# Patient Record
Sex: FEMALE | Race: WHITE | NOT HISPANIC OR LATINO | Employment: OTHER | URBAN - METROPOLITAN AREA
[De-identification: names, ages, dates, MRNs, and addresses within clinical notes are randomized per-mention and may not be internally consistent; named-entity substitution may affect disease eponyms.]

---

## 2017-07-27 ENCOUNTER — HISTORICAL (OUTPATIENT)
Dept: RADIOLOGY | Facility: HOSPITAL | Age: 82
End: 2017-07-27

## 2018-04-19 ENCOUNTER — HISTORICAL (OUTPATIENT)
Dept: LAB | Facility: HOSPITAL | Age: 83
End: 2018-04-19

## 2018-04-19 LAB
ABS NEUT (OLG): 3.2 X10(3)/MCL (ref 2.1–9.2)
ALBUMIN SERPL-MCNC: 3.9 GM/DL (ref 3.4–5)
ALBUMIN/GLOB SERPL: 1.3 RATIO (ref 1.1–2)
ALP SERPL-CCNC: 72 UNIT/L (ref 46–116)
ALT SERPL-CCNC: 21 UNIT/L (ref 12–78)
AST SERPL-CCNC: 21 UNIT/L (ref 15–37)
BASOPHILS NFR BLD AUTO: 1 % (ref 0–2)
BILIRUB SERPL-MCNC: 0.5 MG/DL (ref 0.2–1)
BILIRUBIN DIRECT+TOT PNL SERPL-MCNC: 0.16 MG/DL (ref 0–0.2)
BILIRUBIN DIRECT+TOT PNL SERPL-MCNC: 0.34 MG/DL (ref 0–0.8)
BUN SERPL-MCNC: 15 MG/DL (ref 7–18)
CALCIUM SERPL-MCNC: 9.7 MG/DL (ref 8.5–10.1)
CHLORIDE SERPL-SCNC: 98 MMOL/L (ref 98–107)
CO2 SERPL-SCNC: 26.9 MMOL/L (ref 21–32)
CREAT SERPL-MCNC: 1.09 MG/DL (ref 0.6–1.3)
EOSINOPHIL NFR BLD AUTO: 1 %
ERYTHROCYTE [DISTWIDTH] IN BLOOD BY AUTOMATED COUNT: 13.5 % (ref 11.5–17)
EST. AVERAGE GLUCOSE BLD GHB EST-MCNC: 103 MG/DL
GLOBULIN SER-MCNC: 3.1 GM/DL (ref 2.4–3.5)
GLUCOSE SERPL-MCNC: 145 MG/DL (ref 74–106)
HBA1C MFR BLD: 5.2 % (ref 4.5–6.2)
HCT VFR BLD AUTO: 31.5 % (ref 37–47)
HGB BLD-MCNC: 10.7 GM/DL (ref 12–16)
LYMPHOCYTES # BLD AUTO: 1.3 X10(3)/MCL
LYMPHOCYTES NFR BLD AUTO: 26 % (ref 13–40)
MCH RBC QN AUTO: 30.9 PG (ref 27–31)
MCHC RBC AUTO-ENTMCNC: 33.9 GM/DL (ref 33–36)
MCV RBC AUTO: 91.1 FL (ref 80–94)
MONOCYTES # BLD AUTO: 0.4 X10(3)/MCL
MONOCYTES NFR BLD AUTO: 7 % (ref 2–11)
NEUTROPHILS # BLD AUTO: 3.2 X10(3)/MCL (ref 2.1–9.2)
NEUTROPHILS NFR BLD AUTO: 65 % (ref 47–80)
PLATELET # BLD AUTO: 194 X10(3)/MCL (ref 130–400)
PMV BLD AUTO: 8.3 FL (ref 7.4–10.4)
POTASSIUM SERPL-SCNC: 4 MMOL/L (ref 3.5–5.1)
PROT SERPL-MCNC: 7 GM/DL (ref 6.4–8.2)
RBC # BLD AUTO: 3.45 X10(6)/MCL (ref 4.2–5.4)
SODIUM SERPL-SCNC: 135 MMOL/L (ref 136–145)
TSH SERPL-ACNC: 1.79 MIU/ML (ref 0.36–3.74)
WBC # SPEC AUTO: 4.9 X10(3)/MCL (ref 4.5–11.5)

## 2018-04-23 ENCOUNTER — HISTORICAL (OUTPATIENT)
Dept: RADIOLOGY | Facility: HOSPITAL | Age: 83
End: 2018-04-23

## 2018-06-13 ENCOUNTER — HISTORICAL (OUTPATIENT)
Dept: RADIOLOGY | Facility: HOSPITAL | Age: 83
End: 2018-06-13

## 2019-02-11 ENCOUNTER — HISTORICAL (OUTPATIENT)
Dept: LAB | Facility: HOSPITAL | Age: 84
End: 2019-02-11

## 2019-02-11 LAB
ABS NEUT (OLG): 2.07 X10(3)/MCL (ref 2.1–9.2)
ALBUMIN SERPL-MCNC: 3.7 GM/DL (ref 3.4–5)
ALBUMIN/GLOB SERPL: 1.2 RATIO (ref 1.1–2)
ALP SERPL-CCNC: 78 UNIT/L (ref 46–116)
ALT SERPL-CCNC: 16 UNIT/L (ref 12–78)
AST SERPL-CCNC: 17 UNIT/L (ref 15–37)
BASOPHILS # BLD AUTO: 0 X10(3)/MCL (ref 0–0.2)
BASOPHILS NFR BLD AUTO: 1 %
BILIRUB SERPL-MCNC: 0.6 MG/DL (ref 0.2–1)
BILIRUBIN DIRECT+TOT PNL SERPL-MCNC: 0.18 MG/DL (ref 0–0.2)
BILIRUBIN DIRECT+TOT PNL SERPL-MCNC: 0.42 MG/DL (ref 0–0.8)
BUN SERPL-MCNC: 32.1 MG/DL (ref 7–18)
CALCIUM SERPL-MCNC: 9.8 MG/DL (ref 8.5–10.1)
CHLORIDE SERPL-SCNC: 104 MMOL/L (ref 98–107)
CHOLEST SERPL-MCNC: 128 MG/DL (ref 0–200)
CHOLEST/HDLC SERPL: 1.5 {RATIO} (ref 0–4)
CO2 SERPL-SCNC: 27.5 MMOL/L (ref 21–32)
CREAT SERPL-MCNC: 1.1 MG/DL (ref 0.6–1.3)
EOSINOPHIL # BLD AUTO: 0.1 X10(3)/MCL (ref 0–0.9)
EOSINOPHIL NFR BLD AUTO: 3 %
ERYTHROCYTE [DISTWIDTH] IN BLOOD BY AUTOMATED COUNT: 13 % (ref 11.5–17)
GLOBULIN SER-MCNC: 3 GM/DL (ref 2.4–3.5)
GLUCOSE SERPL-MCNC: 94 MG/DL (ref 74–106)
HCT VFR BLD AUTO: 30.7 % (ref 37–47)
HDLC SERPL-MCNC: 84 MG/DL (ref 40–60)
HGB BLD-MCNC: 10.2 GM/DL (ref 12–16)
LDLC SERPL CALC-MCNC: 36 MG/DL (ref 0–129)
LYMPHOCYTES # BLD AUTO: 1 X10(3)/MCL (ref 0.6–4.6)
LYMPHOCYTES NFR BLD AUTO: 30 %
MCH RBC QN AUTO: 31.3 PG (ref 27–31)
MCHC RBC AUTO-ENTMCNC: 33.2 GM/DL (ref 33–36)
MCV RBC AUTO: 94.2 FL (ref 80–94)
MONOCYTES # BLD AUTO: 0.3 X10(3)/MCL (ref 0.1–1.3)
MONOCYTES NFR BLD AUTO: 8 %
NEUTROPHILS # BLD AUTO: 2.07 X10(3)/MCL (ref 1.4–7.9)
NEUTROPHILS NFR BLD AUTO: 59 %
PLATELET # BLD AUTO: 153 X10(3)/MCL (ref 130–400)
PMV BLD AUTO: 10.1 FL (ref 9.4–12.4)
POTASSIUM SERPL-SCNC: 4.2 MMOL/L (ref 3.5–5.1)
PROT SERPL-MCNC: 6.7 GM/DL (ref 6.4–8.2)
RBC # BLD AUTO: 3.26 X10(6)/MCL (ref 4.2–5.4)
SODIUM SERPL-SCNC: 140 MMOL/L (ref 136–145)
TRIGL SERPL-MCNC: 41 MG/DL
VLDLC SERPL CALC-MCNC: 8 MG/DL
WBC # SPEC AUTO: 3.5 X10(3)/MCL (ref 4.5–11.5)

## 2019-02-19 ENCOUNTER — HISTORICAL (OUTPATIENT)
Dept: LAB | Facility: HOSPITAL | Age: 84
End: 2019-02-19

## 2019-02-19 LAB
COLOR STL: ABNORMAL
CONSISTENCY STL: ABNORMAL
HEMOCCULT SP1 STL QL: POSITIVE

## 2019-04-08 ENCOUNTER — HISTORICAL (OUTPATIENT)
Dept: LAB | Facility: HOSPITAL | Age: 84
End: 2019-04-08

## 2019-04-08 LAB
ABS NEUT (OLG): 1.98 X10(3)/MCL (ref 2.1–9.2)
BASOPHILS # BLD AUTO: 0 X10(3)/MCL (ref 0–0.2)
BASOPHILS NFR BLD AUTO: 1 %
EOSINOPHIL # BLD AUTO: 0.1 X10(3)/MCL (ref 0–0.9)
EOSINOPHIL NFR BLD AUTO: 3 %
ERYTHROCYTE [DISTWIDTH] IN BLOOD BY AUTOMATED COUNT: 12.4 % (ref 11.5–17)
FERRITIN SERPL-MCNC: 71 NG/ML (ref 8–388)
FOLATE SERPL-MCNC: 78 NG/ML (ref 8.6–58.9)
HCT VFR BLD AUTO: 30 % (ref 37–47)
HGB BLD-MCNC: 10.3 GM/DL (ref 12–16)
IRON SATN MFR SERPL: 25 % (ref 20–50)
IRON SERPL-MCNC: 72 MCG/DL (ref 50–175)
LYMPHOCYTES # BLD AUTO: 1.2 X10(3)/MCL (ref 0.6–4.6)
LYMPHOCYTES NFR BLD AUTO: 33 %
MCH RBC QN AUTO: 31.9 PG (ref 27–31)
MCHC RBC AUTO-ENTMCNC: 34.3 GM/DL (ref 33–36)
MCV RBC AUTO: 92.9 FL (ref 80–94)
MONOCYTES # BLD AUTO: 0.4 X10(3)/MCL (ref 0.1–1.3)
MONOCYTES NFR BLD AUTO: 10 %
NEUTROPHILS # BLD AUTO: 1.98 X10(3)/MCL (ref 1.4–7.9)
NEUTROPHILS NFR BLD AUTO: 53 %
PLATELET # BLD AUTO: 151 X10(3)/MCL (ref 130–400)
PMV BLD AUTO: 10.3 FL (ref 9.4–12.4)
RBC # BLD AUTO: 3.23 X10(6)/MCL (ref 4.2–5.4)
TIBC SERPL-MCNC: 288 MCG/DL (ref 250–450)
TRANSFERRIN SERPL-MCNC: 229 MG/DL (ref 200–360)
VIT B12 SERPL-MCNC: 670 PG/ML (ref 193–986)
WBC # SPEC AUTO: 3.7 X10(3)/MCL (ref 4.5–11.5)

## 2019-08-14 ENCOUNTER — HISTORICAL (OUTPATIENT)
Dept: LAB | Facility: HOSPITAL | Age: 84
End: 2019-08-14

## 2019-08-14 LAB
ABS NEUT (OLG): 3.76 X10(3)/MCL (ref 2.1–9.2)
ALBUMIN SERPL-MCNC: 3.8 GM/DL (ref 3.4–5)
ALBUMIN/GLOB SERPL: 1.1 RATIO (ref 1.1–2)
ALP SERPL-CCNC: 80 UNIT/L (ref 46–116)
ALT SERPL-CCNC: 17 UNIT/L (ref 12–78)
AST SERPL-CCNC: 18 UNIT/L (ref 15–37)
BASOPHILS # BLD AUTO: 0 X10(3)/MCL (ref 0–0.2)
BASOPHILS NFR BLD AUTO: 0 %
BILIRUB SERPL-MCNC: 0.6 MG/DL (ref 0.2–1)
BILIRUBIN DIRECT+TOT PNL SERPL-MCNC: 0.18 MG/DL (ref 0–0.2)
BILIRUBIN DIRECT+TOT PNL SERPL-MCNC: 0.42 MG/DL (ref 0–0.8)
BUN SERPL-MCNC: 37.3 MG/DL (ref 7–18)
CALCIUM SERPL-MCNC: 10.2 MG/DL (ref 8.5–10.1)
CHLORIDE SERPL-SCNC: 106 MMOL/L (ref 98–107)
CHOLEST SERPL-MCNC: 137 MG/DL (ref 0–200)
CHOLEST/HDLC SERPL: 1.6 {RATIO} (ref 0–4)
CO2 SERPL-SCNC: 29.8 MMOL/L (ref 21–32)
CREAT SERPL-MCNC: 1.25 MG/DL (ref 0.6–1.3)
DEPRECATED CALCIDIOL+CALCIFEROL SERPL-MC: 52.01 NG/ML (ref 30–80)
EOSINOPHIL # BLD AUTO: 0.2 X10(3)/MCL (ref 0–0.9)
EOSINOPHIL NFR BLD AUTO: 3 %
ERYTHROCYTE [DISTWIDTH] IN BLOOD BY AUTOMATED COUNT: 12.5 % (ref 11.5–17)
GLOBULIN SER-MCNC: 3.4 GM/DL (ref 2.4–3.5)
GLUCOSE SERPL-MCNC: 102 MG/DL (ref 74–106)
HCT VFR BLD AUTO: 33.1 % (ref 37–47)
HDLC SERPL-MCNC: 84 MG/DL (ref 40–60)
HGB BLD-MCNC: 10.6 GM/DL (ref 12–16)
IMM GRANULOCYTES # BLD AUTO: 0.01 % (ref 0–0.02)
IMM GRANULOCYTES NFR BLD AUTO: 0.2 % (ref 0–0.43)
LDLC SERPL CALC-MCNC: 37 MG/DL (ref 0–129)
LYMPHOCYTES # BLD AUTO: 1.6 X10(3)/MCL (ref 0.6–4.6)
LYMPHOCYTES NFR BLD AUTO: 26 %
MCH RBC QN AUTO: 31.6 PG (ref 27–31)
MCHC RBC AUTO-ENTMCNC: 32 GM/DL (ref 33–36)
MCV RBC AUTO: 98.8 FL (ref 80–94)
MONOCYTES # BLD AUTO: 0.4 X10(3)/MCL (ref 0.1–1.3)
MONOCYTES NFR BLD AUTO: 8 %
NEUTROPHILS # BLD AUTO: 3.76 X10(3)/MCL (ref 1.4–7.9)
NEUTROPHILS NFR BLD AUTO: 62 %
PLATELET # BLD AUTO: 159 X10(3)/MCL (ref 130–400)
PMV BLD AUTO: 9.3 FL (ref 9.4–12.4)
POTASSIUM SERPL-SCNC: 4.6 MMOL/L (ref 3.5–5.1)
PROT SERPL-MCNC: 7.2 GM/DL (ref 6.4–8.2)
RBC # BLD AUTO: 3.35 X10(6)/MCL (ref 4.2–5.4)
SODIUM SERPL-SCNC: 142 MMOL/L (ref 136–145)
TRIGL SERPL-MCNC: 78 MG/DL
VLDLC SERPL CALC-MCNC: 16 MG/DL
WBC # SPEC AUTO: 6 X10(3)/MCL (ref 4.5–11.5)

## 2020-02-13 ENCOUNTER — HISTORICAL (OUTPATIENT)
Dept: LAB | Facility: HOSPITAL | Age: 85
End: 2020-02-13

## 2020-02-13 LAB
ABS NEUT (OLG): 3.07 X10(3)/MCL (ref 2.1–9.2)
ALBUMIN SERPL-MCNC: 3.8 GM/DL (ref 3.4–5)
ALBUMIN/GLOB SERPL: 1.4 RATIO (ref 1.1–2)
ALP SERPL-CCNC: 75 UNIT/L (ref 46–116)
ALT SERPL-CCNC: 19 UNIT/L (ref 12–78)
AST SERPL-CCNC: 18 UNIT/L (ref 15–37)
BASOPHILS # BLD AUTO: 0 X10(3)/MCL (ref 0–0.2)
BASOPHILS NFR BLD AUTO: 1 %
BILIRUB SERPL-MCNC: 0.4 MG/DL (ref 0.2–1)
BILIRUBIN DIRECT+TOT PNL SERPL-MCNC: 0.17 MG/DL (ref 0–0.2)
BILIRUBIN DIRECT+TOT PNL SERPL-MCNC: 0.23 MG/DL (ref 0–0.8)
BUN SERPL-MCNC: 31.9 MG/DL (ref 7–18)
CALCIUM SERPL-MCNC: 10 MG/DL (ref 8.5–10.1)
CHLORIDE SERPL-SCNC: 106 MMOL/L (ref 98–107)
CHOLEST SERPL-MCNC: 125 MG/DL (ref 0–200)
CHOLEST/HDLC SERPL: 1.7 {RATIO} (ref 0–4)
CO2 SERPL-SCNC: 26.4 MMOL/L (ref 21–32)
CREAT SERPL-MCNC: 1.17 MG/DL (ref 0.6–1.3)
EOSINOPHIL # BLD AUTO: 0.1 X10(3)/MCL (ref 0–0.9)
EOSINOPHIL NFR BLD AUTO: 3 %
ERYTHROCYTE [DISTWIDTH] IN BLOOD BY AUTOMATED COUNT: 12.5 % (ref 11.5–17)
GLOBULIN SER-MCNC: 2.7 GM/DL (ref 2.4–3.5)
GLUCOSE SERPL-MCNC: 106 MG/DL (ref 74–106)
HCT VFR BLD AUTO: 31.5 % (ref 37–47)
HDLC SERPL-MCNC: 73 MG/DL (ref 40–60)
HGB BLD-MCNC: 10.3 GM/DL (ref 12–16)
IMM GRANULOCYTES # BLD AUTO: 0.01 % (ref 0–0.02)
IMM GRANULOCYTES NFR BLD AUTO: 0.2 % (ref 0–0.43)
LDLC SERPL CALC-MCNC: 34 MG/DL (ref 0–129)
LYMPHOCYTES # BLD AUTO: 1.1 X10(3)/MCL (ref 0.6–4.6)
LYMPHOCYTES NFR BLD AUTO: 23 %
MCH RBC QN AUTO: 31.9 PG (ref 27–31)
MCHC RBC AUTO-ENTMCNC: 32.7 GM/DL (ref 33–36)
MCV RBC AUTO: 97.5 FL (ref 80–94)
MONOCYTES # BLD AUTO: 0.4 X10(3)/MCL (ref 0.1–1.3)
MONOCYTES NFR BLD AUTO: 8 %
NEUTROPHILS # BLD AUTO: 3.07 X10(3)/MCL (ref 1.4–7.9)
NEUTROPHILS NFR BLD AUTO: 65 %
PLATELET # BLD AUTO: 214 X10(3)/MCL (ref 130–400)
PMV BLD AUTO: 10.1 FL (ref 9.4–12.4)
POTASSIUM SERPL-SCNC: 4.8 MMOL/L (ref 3.5–5.1)
PROT SERPL-MCNC: 6.5 GM/DL (ref 6.4–8.2)
RBC # BLD AUTO: 3.23 X10(6)/MCL (ref 4.2–5.4)
SODIUM SERPL-SCNC: 143 MMOL/L (ref 136–145)
TRIGL SERPL-MCNC: 88 MG/DL
VLDLC SERPL CALC-MCNC: 18 MG/DL
WBC # SPEC AUTO: 4.7 X10(3)/MCL (ref 4.5–11.5)

## 2021-02-22 ENCOUNTER — HISTORICAL (OUTPATIENT)
Dept: LAB | Facility: HOSPITAL | Age: 86
End: 2021-02-22

## 2021-02-22 LAB
ABS NEUT (OLG): 3.16 X10(3)/MCL (ref 2.1–9.2)
ALBUMIN SERPL-MCNC: 3.7 GM/DL (ref 3.4–4.8)
ALBUMIN/GLOB SERPL: 1.2 RATIO (ref 1.1–2)
ALP SERPL-CCNC: 86 UNIT/L (ref 40–150)
ALT SERPL-CCNC: 11 UNIT/L (ref 0–55)
AST SERPL-CCNC: 18 UNIT/L (ref 5–34)
BASOPHILS # BLD AUTO: 0 X10(3)/MCL (ref 0–0.2)
BASOPHILS NFR BLD AUTO: 1 %
BILIRUB SERPL-MCNC: 0.7 MG/DL
BILIRUBIN DIRECT+TOT PNL SERPL-MCNC: 0.3 MG/DL (ref 0–0.5)
BILIRUBIN DIRECT+TOT PNL SERPL-MCNC: 0.4 MG/DL (ref 0–0.8)
BUN SERPL-MCNC: 35.3 MG/DL (ref 9.8–20.1)
CALCIUM SERPL-MCNC: 8.9 MG/DL (ref 8.4–10.2)
CHLORIDE SERPL-SCNC: 106 MMOL/L (ref 98–111)
CHOLEST SERPL-MCNC: 128 MG/DL
CHOLEST/HDLC SERPL: 2 {RATIO} (ref 0–5)
CO2 SERPL-SCNC: 24 MMOL/L (ref 23–31)
CREAT SERPL-MCNC: 1.15 MG/DL (ref 0.55–1.02)
DEPRECATED CALCIDIOL+CALCIFEROL SERPL-MC: 44.2 NG/ML (ref 30–80)
EOSINOPHIL # BLD AUTO: 0.2 X10(3)/MCL (ref 0–0.9)
EOSINOPHIL NFR BLD AUTO: 4 %
ERYTHROCYTE [DISTWIDTH] IN BLOOD BY AUTOMATED COUNT: 12.6 % (ref 11.5–17)
GLOBULIN SER-MCNC: 3 GM/DL (ref 2.4–3.5)
GLUCOSE SERPL-MCNC: 129 MG/DL (ref 75–121)
HCT VFR BLD AUTO: 32.5 % (ref 37–47)
HDLC SERPL-MCNC: 68 MG/DL (ref 35–60)
HGB BLD-MCNC: 10.2 GM/DL (ref 12–16)
LDLC SERPL CALC-MCNC: 46 MG/DL (ref 50–140)
LYMPHOCYTES # BLD AUTO: 1.5 X10(3)/MCL (ref 0.6–4.6)
LYMPHOCYTES NFR BLD AUTO: 28 %
MCH RBC QN AUTO: 31.9 PG (ref 27–31)
MCHC RBC AUTO-ENTMCNC: 31.4 GM/DL (ref 33–36)
MCV RBC AUTO: 101.6 FL (ref 80–94)
MONOCYTES # BLD AUTO: 0.5 X10(3)/MCL (ref 0.1–1.3)
MONOCYTES NFR BLD AUTO: 9 %
NEUTROPHILS # BLD AUTO: 3.16 X10(3)/MCL (ref 1.4–7.9)
NEUTROPHILS NFR BLD AUTO: 59 %
PLATELET # BLD AUTO: 211 X10(3)/MCL (ref 130–400)
PMV BLD AUTO: 10.1 FL (ref 9.4–12.4)
POTASSIUM SERPL-SCNC: 4.6 MMOL/L (ref 3.5–5.1)
PROT SERPL-MCNC: 6.7 GM/DL (ref 5.8–7.6)
RBC # BLD AUTO: 3.2 X10(6)/MCL (ref 4.2–5.4)
SODIUM SERPL-SCNC: 141 MMOL/L (ref 132–146)
TRIGL SERPL-MCNC: 70 MG/DL (ref 37–140)
TSH SERPL-ACNC: 1.56 UIU/ML (ref 0.35–4.94)
VLDLC SERPL CALC-MCNC: 14 MG/DL
WBC # SPEC AUTO: 5.4 X10(3)/MCL (ref 4.5–11.5)

## 2022-04-10 ENCOUNTER — HISTORICAL (OUTPATIENT)
Dept: ADMINISTRATIVE | Facility: HOSPITAL | Age: 87
End: 2022-04-10

## 2022-04-25 VITALS — DIASTOLIC BLOOD PRESSURE: 70 MMHG | SYSTOLIC BLOOD PRESSURE: 100 MMHG

## 2022-06-27 ENCOUNTER — HOSPITAL ENCOUNTER (OUTPATIENT)
Facility: HOSPITAL | Age: 87
Discharge: HOME | End: 2022-06-28
Attending: STUDENT IN AN ORGANIZED HEALTH CARE EDUCATION/TRAINING PROGRAM | Admitting: STUDENT IN AN ORGANIZED HEALTH CARE EDUCATION/TRAINING PROGRAM
Payer: MEDICARE

## 2022-06-27 DIAGNOSIS — N17.9 AKI (ACUTE KIDNEY INJURY): ICD-10-CM

## 2022-06-27 DIAGNOSIS — D63.8 ANEMIA OF CHRONIC DISEASE: Primary | ICD-10-CM

## 2022-06-27 PROBLEM — E11.9 TYPE 2 DIABETES MELLITUS, WITHOUT LONG-TERM CURRENT USE OF INSULIN: Chronic | Status: ACTIVE | Noted: 2022-06-27

## 2022-06-27 PROBLEM — D62 ACUTE BLOOD LOSS ANEMIA: Status: ACTIVE | Noted: 2022-06-27

## 2022-06-27 PROBLEM — I10 HTN (HYPERTENSION): Status: ACTIVE | Noted: 2022-06-27

## 2022-06-27 PROBLEM — I38 CARDIAC VALVULOPATHY: Chronic | Status: ACTIVE | Noted: 2022-06-27

## 2022-06-27 LAB
ABO + RH BLD: NORMAL
ABORH RETYPE: NORMAL
ALBUMIN SERPL-MCNC: 2.7 GM/DL (ref 3.4–4.8)
ALBUMIN/GLOB SERPL: 0.8 RATIO (ref 1.1–2)
ALP SERPL-CCNC: 83 UNIT/L (ref 40–150)
ALT SERPL-CCNC: 11 UNIT/L (ref 0–55)
AST SERPL-CCNC: 26 UNIT/L (ref 5–34)
BASOPHILS # BLD AUTO: 0.03 X10(3)/MCL (ref 0–0.2)
BASOPHILS # BLD AUTO: 0.04 X10(3)/MCL (ref 0–0.2)
BASOPHILS NFR BLD AUTO: 0.5 %
BASOPHILS NFR BLD AUTO: 0.6 %
BILIRUBIN DIRECT+TOT PNL SERPL-MCNC: 0.3 MG/DL
BLD PROD TYP BPU: NORMAL
BLOOD UNIT EXPIRATION DATE: NORMAL
BLOOD UNIT TYPE CODE: 5100
BUN SERPL-MCNC: 31.8 MG/DL (ref 9.8–20.1)
CALCIUM SERPL-MCNC: 9.4 MG/DL (ref 8.4–10.2)
CHLORIDE SERPL-SCNC: 107 MMOL/L (ref 98–111)
CO2 SERPL-SCNC: 24 MMOL/L (ref 23–31)
CREAT SERPL-MCNC: 1.16 MG/DL (ref 0.55–1.02)
CROSSMATCH INTERPRETATION: NORMAL
DISPENSE STATUS: NORMAL
EOSINOPHIL # BLD AUTO: 0.11 X10(3)/MCL (ref 0–0.9)
EOSINOPHIL # BLD AUTO: 0.15 X10(3)/MCL (ref 0–0.9)
EOSINOPHIL NFR BLD AUTO: 1.7 %
EOSINOPHIL NFR BLD AUTO: 2.3 %
ERYTHROCYTE [DISTWIDTH] IN BLOOD BY AUTOMATED COUNT: 13.9 % (ref 11.5–17)
ERYTHROCYTE [DISTWIDTH] IN BLOOD BY AUTOMATED COUNT: 14.5 % (ref 11.5–17)
GLOBULIN SER-MCNC: 3.6 GM/DL (ref 2.4–3.5)
GLUCOSE SERPL-MCNC: 121 MG/DL (ref 70–110)
GLUCOSE SERPL-MCNC: 130 MG/DL (ref 75–121)
GROUP & RH: NORMAL
HCT VFR BLD AUTO: 24.2 % (ref 37–47)
HCT VFR BLD AUTO: 27.1 % (ref 37–47)
HGB BLD-MCNC: 7.2 GM/DL (ref 12–16)
HGB BLD-MCNC: 8.1 GM/DL (ref 12–16)
IMM GRANULOCYTES # BLD AUTO: 0.01 X10(3)/MCL (ref 0–0.02)
IMM GRANULOCYTES # BLD AUTO: 0.01 X10(3)/MCL (ref 0–0.02)
IMM GRANULOCYTES NFR BLD AUTO: 0.2 % (ref 0–0.43)
IMM GRANULOCYTES NFR BLD AUTO: 0.2 % (ref 0–0.43)
INDIRECT COOMBS GEL: NORMAL
LYMPHOCYTES # BLD AUTO: 1.05 X10(3)/MCL (ref 0.6–4.6)
LYMPHOCYTES # BLD AUTO: 1.1 X10(3)/MCL (ref 0.6–4.6)
LYMPHOCYTES NFR BLD AUTO: 16.3 %
LYMPHOCYTES NFR BLD AUTO: 16.9 %
MCH RBC QN AUTO: 27.8 PG (ref 27–31)
MCH RBC QN AUTO: 28.6 PG (ref 27–31)
MCHC RBC AUTO-ENTMCNC: 29.8 MG/DL (ref 33–36)
MCHC RBC AUTO-ENTMCNC: 29.9 MG/DL (ref 33–36)
MCV RBC AUTO: 93.1 FL (ref 80–94)
MCV RBC AUTO: 96 FL (ref 80–94)
MONOCYTES # BLD AUTO: 0.57 X10(3)/MCL (ref 0.1–1.3)
MONOCYTES # BLD AUTO: 0.58 X10(3)/MCL (ref 0.1–1.3)
MONOCYTES NFR BLD AUTO: 8.9 %
MONOCYTES NFR BLD AUTO: 8.9 %
NEUTROPHILS # BLD AUTO: 4.6 X10(3)/MCL (ref 2.1–9.2)
NEUTROPHILS # BLD AUTO: 4.7 X10(3)/MCL (ref 2.1–9.2)
NEUTROPHILS NFR BLD AUTO: 71.1 %
NEUTROPHILS NFR BLD AUTO: 72.4 %
PLATELET # BLD AUTO: 279 X10(3)/MCL (ref 130–400)
PLATELET # BLD AUTO: 315 X10(3)/MCL (ref 130–400)
PMV BLD AUTO: 9.1 FL (ref 9.4–12.4)
PMV BLD AUTO: 9.7 FL (ref 9.4–12.4)
POCT GLUCOSE: 121 MG/DL (ref 70–110)
POTASSIUM SERPL-SCNC: 4.6 MMOL/L (ref 3.5–5.1)
PROT SERPL-MCNC: 6.3 GM/DL (ref 5.8–7.6)
RBC # BLD AUTO: 2.52 X10(6)/MCL (ref 4.2–5.4)
RBC # BLD AUTO: 2.91 X10(6)/MCL (ref 4.2–5.4)
SARS-COV-2 RDRP RESP QL NAA+PROBE: NEGATIVE
SODIUM SERPL-SCNC: 141 MMOL/L (ref 132–146)
UNIT NUMBER: NORMAL
WBC # SPEC AUTO: 6.4 X10(3)/MCL (ref 4.5–11.5)
WBC # SPEC AUTO: 6.5 X10(3)/MCL (ref 4.5–11.5)

## 2022-06-27 PROCEDURE — 99900035 HC TECH TIME PER 15 MIN (STAT)

## 2022-06-27 PROCEDURE — 36430 TRANSFUSION BLD/BLD COMPNT: CPT

## 2022-06-27 PROCEDURE — 86850 RBC ANTIBODY SCREEN: CPT | Performed by: STUDENT IN AN ORGANIZED HEALTH CARE EDUCATION/TRAINING PROGRAM

## 2022-06-27 PROCEDURE — G0378 HOSPITAL OBSERVATION PER HR: HCPCS

## 2022-06-27 PROCEDURE — 80053 COMPREHEN METABOLIC PANEL: CPT | Performed by: STUDENT IN AN ORGANIZED HEALTH CARE EDUCATION/TRAINING PROGRAM

## 2022-06-27 PROCEDURE — P9016 RBC LEUKOCYTES REDUCED: HCPCS | Performed by: STUDENT IN AN ORGANIZED HEALTH CARE EDUCATION/TRAINING PROGRAM

## 2022-06-27 PROCEDURE — 86920 COMPATIBILITY TEST SPIN: CPT | Performed by: STUDENT IN AN ORGANIZED HEALTH CARE EDUCATION/TRAINING PROGRAM

## 2022-06-27 PROCEDURE — 36415 COLL VENOUS BLD VENIPUNCTURE: CPT | Performed by: STUDENT IN AN ORGANIZED HEALTH CARE EDUCATION/TRAINING PROGRAM

## 2022-06-27 PROCEDURE — 94760 N-INVAS EAR/PLS OXIMETRY 1: CPT

## 2022-06-27 PROCEDURE — 85025 COMPLETE CBC W/AUTO DIFF WBC: CPT | Performed by: STUDENT IN AN ORGANIZED HEALTH CARE EDUCATION/TRAINING PROGRAM

## 2022-06-27 PROCEDURE — 87635 SARS-COV-2 COVID-19 AMP PRB: CPT | Performed by: STUDENT IN AN ORGANIZED HEALTH CARE EDUCATION/TRAINING PROGRAM

## 2022-06-27 PROCEDURE — 99285 EMERGENCY DEPT VISIT HI MDM: CPT | Mod: 25

## 2022-06-27 PROCEDURE — 85025 COMPLETE CBC W/AUTO DIFF WBC: CPT | Mod: 91 | Performed by: STUDENT IN AN ORGANIZED HEALTH CARE EDUCATION/TRAINING PROGRAM

## 2022-06-27 RX ORDER — OLMESARTAN MEDOXOMIL 40 MG/1
40 TABLET ORAL DAILY
Status: ON HOLD | COMMUNITY
Start: 2022-04-21 | End: 2022-11-06 | Stop reason: HOSPADM

## 2022-06-27 RX ORDER — INSULIN ASPART 100 [IU]/ML
0-5 INJECTION, SOLUTION INTRAVENOUS; SUBCUTANEOUS
Status: DISCONTINUED | OUTPATIENT
Start: 2022-06-27 | End: 2022-06-28 | Stop reason: HOSPADM

## 2022-06-27 RX ORDER — MELOXICAM 15 MG/1
15 TABLET ORAL DAILY
Status: ON HOLD | COMMUNITY
Start: 2022-06-14 | End: 2022-06-28 | Stop reason: HOSPADM

## 2022-06-27 RX ORDER — TALC
6 POWDER (GRAM) TOPICAL NIGHTLY PRN
Status: DISCONTINUED | OUTPATIENT
Start: 2022-06-27 | End: 2022-06-28 | Stop reason: HOSPADM

## 2022-06-27 RX ORDER — ACETAMINOPHEN 325 MG/1
650 TABLET ORAL EVERY 4 HOURS PRN
Status: DISCONTINUED | OUTPATIENT
Start: 2022-06-27 | End: 2022-06-28 | Stop reason: HOSPADM

## 2022-06-27 RX ORDER — ATORVASTATIN CALCIUM 40 MG/1
40 TABLET, FILM COATED ORAL DAILY
Status: ON HOLD | COMMUNITY
Start: 2022-05-10 | End: 2022-11-04 | Stop reason: CLARIF

## 2022-06-27 RX ORDER — HYDROCODONE BITARTRATE AND ACETAMINOPHEN 500; 5 MG/1; MG/1
TABLET ORAL
Status: DISCONTINUED | OUTPATIENT
Start: 2022-06-27 | End: 2022-06-28 | Stop reason: HOSPADM

## 2022-06-27 RX ORDER — ASPIRIN 81 MG/1
81 TABLET ORAL
COMMUNITY
End: 2022-06-28

## 2022-06-27 RX ORDER — ALPRAZOLAM 0.25 MG/1
0.25 TABLET ORAL NIGHTLY PRN
Status: DISCONTINUED | OUTPATIENT
Start: 2022-06-27 | End: 2022-06-28 | Stop reason: HOSPADM

## 2022-06-27 RX ORDER — VALSARTAN 160 MG/1
320 TABLET ORAL DAILY
Status: DISCONTINUED | OUTPATIENT
Start: 2022-06-28 | End: 2022-06-28 | Stop reason: HOSPADM

## 2022-06-27 RX ORDER — AMLODIPINE BESYLATE 2.5 MG/1
2.5 TABLET ORAL DAILY
Status: ON HOLD | COMMUNITY
Start: 2022-06-16 | End: 2022-06-27

## 2022-06-27 RX ORDER — SODIUM CHLORIDE 0.9 % (FLUSH) 0.9 %
10 SYRINGE (ML) INJECTION
Status: DISCONTINUED | OUTPATIENT
Start: 2022-06-27 | End: 2022-06-27

## 2022-06-27 RX ORDER — ATORVASTATIN CALCIUM 40 MG/1
1 TABLET, FILM COATED ORAL DAILY
Status: ON HOLD | COMMUNITY
Start: 2022-05-02 | End: 2022-06-28 | Stop reason: HOSPADM

## 2022-06-27 RX ORDER — ATORVASTATIN CALCIUM 40 MG/1
40 TABLET, FILM COATED ORAL DAILY
Status: DISCONTINUED | OUTPATIENT
Start: 2022-06-28 | End: 2022-06-28 | Stop reason: HOSPADM

## 2022-06-27 RX ORDER — AMLODIPINE BESYLATE 5 MG/1
5 TABLET ORAL DAILY
Status: DISCONTINUED | OUTPATIENT
Start: 2022-06-28 | End: 2022-06-28 | Stop reason: HOSPADM

## 2022-06-27 RX ORDER — IBUPROFEN 200 MG
24 TABLET ORAL
Status: DISCONTINUED | OUTPATIENT
Start: 2022-06-27 | End: 2022-06-28 | Stop reason: HOSPADM

## 2022-06-27 RX ORDER — SODIUM CHLORIDE 0.9 % (FLUSH) 0.9 %
2 SYRINGE (ML) INJECTION EVERY 12 HOURS PRN
Status: DISCONTINUED | OUTPATIENT
Start: 2022-06-27 | End: 2022-06-28 | Stop reason: HOSPADM

## 2022-06-27 RX ORDER — ONDANSETRON 2 MG/ML
4 INJECTION INTRAMUSCULAR; INTRAVENOUS EVERY 8 HOURS PRN
Status: DISCONTINUED | OUTPATIENT
Start: 2022-06-27 | End: 2022-06-28 | Stop reason: HOSPADM

## 2022-06-27 RX ORDER — IBUPROFEN 200 MG
16 TABLET ORAL
Status: DISCONTINUED | OUTPATIENT
Start: 2022-06-27 | End: 2022-06-28 | Stop reason: HOSPADM

## 2022-06-27 RX ORDER — GLUCAGON 1 MG
1 KIT INJECTION
Status: DISCONTINUED | OUTPATIENT
Start: 2022-06-27 | End: 2022-06-28 | Stop reason: HOSPADM

## 2022-06-27 RX ORDER — AMLODIPINE BESYLATE 5 MG/1
5 TABLET ORAL DAILY
Status: ON HOLD | COMMUNITY
Start: 2022-03-07 | End: 2022-11-04 | Stop reason: CLARIF

## 2022-06-27 RX ORDER — ACETAMINOPHEN 325 MG/1
650 TABLET ORAL EVERY 8 HOURS PRN
Status: DISCONTINUED | OUTPATIENT
Start: 2022-06-27 | End: 2022-06-28 | Stop reason: HOSPADM

## 2022-06-27 RX ORDER — POLYETHYLENE GLYCOL 3350 17 G/17G
17 POWDER, FOR SOLUTION ORAL 2 TIMES DAILY PRN
Status: DISCONTINUED | OUTPATIENT
Start: 2022-06-27 | End: 2022-06-28 | Stop reason: HOSPADM

## 2022-06-27 RX ORDER — MELOXICAM 15 MG/1
1 TABLET ORAL DAILY
Status: ON HOLD | COMMUNITY
Start: 2022-02-04 | End: 2022-06-28 | Stop reason: HOSPADM

## 2022-06-27 RX ORDER — IPRATROPIUM BROMIDE AND ALBUTEROL SULFATE 2.5; .5 MG/3ML; MG/3ML
3 SOLUTION RESPIRATORY (INHALATION) EVERY 6 HOURS PRN
Status: DISCONTINUED | OUTPATIENT
Start: 2022-06-27 | End: 2022-06-28 | Stop reason: HOSPADM

## 2022-06-27 NOTE — PLAN OF CARE
[facility]  Initial Discharge Assessment       Primary Care Provider: No primary care provider on file.    Admission Diagnosis: TOO (acute kidney injury) [N17.9]  Anemia of chronic disease [D63.8]    Admission Date: 6/27/2022  Expected Discharge Date:     Discharge Barriers Identified: None    Payor: MEDICARE / Plan: MEDICARE PART A & B / Product Type: Government /     Extended Emergency Contact Information  Primary Emergency Contact: kervin dunlap  Mobile Phone: [phone]  Relation: Significant other  Preferred language: English   needed? No    Discharge Plan A: Home with family         [address] [address]  Phone: [phone] Fax: [phone]      Initial Assessment (most recent)     Adult Discharge Assessment - 06/27/22 1347        Discharge Assessment    Assessment Type Discharge Planning Assessment     Confirmed/corrected address, phone number and insurance Yes     Confirmed Demographics Correct on Facesheet     Source of Information patient     If unable to respond/provide information was family/caregiver contacted? Yes     Contact Name/Number Kervin Dunlap  [phone]     Does patient/caregiver understand observation status Yes     Communicated CODY with patient/caregiver Date not available/Unable to determine     Reason For Admission Anemia     Lives With spouse     Facility Arrived From: home     Do you expect to return to your current living situation? Yes     Do you have help at home or someone to help you manage your care at home? Yes     Who are your caregiver(s) and their phone number(s)? Kervin Dunlap  [phone]     Prior to hospitilization cognitive status: Alert/Oriented     Current cognitive status: Alert/Oriented     Walking or Climbing Stairs Difficulty none     Dressing/Bathing Difficulty none     Home Accessibility wheelchair accessible     Home Layout Able to  live on 1st floor     Equipment Currently Used at Home none     Readmission within 30 days? No     Patient currently being followed by outpatient case management? No     Do you currently have service(s) that help you manage your care at home? No     Do you take prescription medications? Yes     Do you have prescription coverage? Yes     Do you have any problems affording any of your prescribed medications? TBD     Is the patient taking medications as prescribed? yes     Who is going to help you get home at discharge? Kervin Dunlap  [phone]     How do you get to doctors appointments? family or friend will provide     Are you on dialysis? No     Do you take coumadin? No     Discharge Plan A Home with family     DME Needed Upon Discharge  none     Discharge Plan discussed with: Spouse/sig other     Name(s) and Number(s) Kervin Dunlap  [phone]     Discharge Barriers Identified None

## 2022-06-27 NOTE — ED PROVIDER NOTES
Encounter Date: 6/27/2022       History     Chief Complaint   Patient presents with    Other Misc     Per son, he was called by [facility] to say that the patient's blood count is low and she needed to get to the ER. Per son, patient has had no symptoms of weakness. Per son, patient has a history of anemia and hemorrhoids.      89-year-old female presents to ED with son after being called by [facility] today with results from recent blood work from 5 days ago with low HH, blood drawn in ED today and HH 7.2/24.  Patient asymptomatic and states she feels fine.  Denies any rectal bleeding or bloody or dark stools patient son at bedside today and states rthey check her stool daily and have not noted any black, tarry or bloody stool.  Denies any bloody vomiting or any other issues.  Patient has a history of chronic anemia, in 2019 colonoscopy was deferred due to patient's H and lack of symptoms.  Plan was to continue to monitor for any dark / blood in stools.        Review of patient's allergies indicates:   Allergen Reactions    Atorvastatin      Cramps    Sulfa (sulfonamide antibiotics)      Questionable in record     No past medical history on file.  No past surgical history on file.  No family history on file.     Review of Systems   Constitutional: Negative for fever.   HENT: Negative for sore throat.    Respiratory: Negative for shortness of breath.    Cardiovascular: Negative for chest pain.   Gastrointestinal: Negative for nausea.   Genitourinary: Negative for dysuria.   Musculoskeletal: Negative for back pain.   Skin: Negative for rash.   Neurological: Negative for weakness.   Hematological: Does not bruise/bleed easily.       Physical Exam     Initial Vitals [06/27/22 0853]   BP Pulse Resp Temp SpO2   (!) 147/58 66 20 98.3 °F (36.8 °C) 99 %      MAP       --         Physical Exam    Nursing note and vitals reviewed.  Constitutional: She appears well-developed and well-nourished.   HENT:   Head: Normocephalic and  atraumatic.   Eyes: EOM are normal. Pupils are equal, round, and reactive to light.   Neck: Neck supple.   Normal range of motion.  Cardiovascular: Normal rate, regular rhythm, normal heart sounds and intact distal pulses.   Pulmonary/Chest: Breath sounds normal.   Abdominal: Abdomen is soft. Bowel sounds are normal.   Musculoskeletal:         General: No tenderness or edema.      Cervical back: Normal range of motion and neck supple.     Neurological: She is alert and oriented to person, place, and time.   Skin: Skin is warm and dry.   Psychiatric: She has a normal mood and affect. Thought content normal.         ED Course   Procedures  Labs Reviewed   COMPREHENSIVE METABOLIC PANEL - Abnormal; Notable for the following components:       Result Value    Glucose Level 130 (*)     Blood Urea Nitrogen 31.8 (*)     Creatinine 1.16 (*)     Albumin Level 2.7 (*)     Globulin 3.6 (*)     Albumin/Globulin Ratio 0.8 (*)     All other components within normal limits   CBC WITH DIFFERENTIAL - Abnormal; Notable for the following components:    RBC 2.52 (*)     Hgb 7.2 (*)     Hct 24.2 (*)     MCV 96.0 (*)     MCHC 29.8 (*)     All other components within normal limits   CBC W/ AUTO DIFFERENTIAL    Narrative:     The following orders were created for panel order CBC auto differential.  Procedure                               Abnormality         Status                     ---------                               -----------         ------                     CBC with Differential[[id number]]        Abnormal            Final result                 Please view results for these tests on the individual orders.   SARS-COV-2 RNA AMPLIFICATION, QUAL   TYPE & SCREEN          Imaging Results    None          Medications   sodium chloride 0.9% flush 10 mL (has no administration in time range)     Medical Decision Making:   ED Management:  Discussed with patient and son that it is recommended pt received blood transfusion due to HH near  cutoff of 7 , current HH 7.2/24.2). pt asymptomatic however unable to setup outpatient transfusion from ED. Pt agrees with admit for observation and blood transfusion                      Clinical Impression:   Final diagnoses:  [D63.8] Anemia of chronic disease (Primary)  [N17.9] TOO (acute kidney injury)          ED Disposition Condition    Observation               Kori Wilson MD  06/27/22 8872

## 2022-06-27 NOTE — ED NOTES
Pt transferred via wheelchair to [facility] with bedside report given to [place]. No distress noted.

## 2022-06-28 VITALS
TEMPERATURE: 98 F | WEIGHT: 123.69 LBS | DIASTOLIC BLOOD PRESSURE: 60 MMHG | SYSTOLIC BLOOD PRESSURE: 146 MMHG | OXYGEN SATURATION: 98 % | RESPIRATION RATE: 14 BRPM | HEIGHT: 66 IN | BODY MASS INDEX: 19.88 KG/M2 | HEART RATE: 98 BPM

## 2022-06-28 LAB
ANION GAP SERPL CALC-SCNC: 10 MEQ/L
BASOPHILS # BLD AUTO: 0.04 X10(3)/MCL (ref 0–0.2)
BASOPHILS NFR BLD AUTO: 0.6 %
BUN SERPL-MCNC: 23 MG/DL (ref 9.8–20.1)
CALCIUM SERPL-MCNC: 9.5 MG/DL (ref 8.4–10.2)
CHLORIDE SERPL-SCNC: 107 MMOL/L (ref 98–111)
CO2 SERPL-SCNC: 23 MMOL/L (ref 23–31)
CREAT SERPL-MCNC: 0.95 MG/DL (ref 0.55–1.02)
CREAT/UREA NIT SERPL: 24
EOSINOPHIL # BLD AUTO: 0.18 X10(3)/MCL (ref 0–0.9)
EOSINOPHIL NFR BLD AUTO: 2.5 %
ERYTHROCYTE [DISTWIDTH] IN BLOOD BY AUTOMATED COUNT: 14.6 % (ref 11.5–17)
GLUCOSE SERPL-MCNC: 86 MG/DL (ref 70–110)
GLUCOSE SERPL-MCNC: 94 MG/DL (ref 75–121)
HCT VFR BLD AUTO: 27.9 % (ref 37–47)
HEMOCCULT SP1 STL QL: POSITIVE
HGB BLD-MCNC: 8.4 GM/DL (ref 12–16)
IMM GRANULOCYTES # BLD AUTO: 0.01 X10(3)/MCL (ref 0–0.02)
IMM GRANULOCYTES NFR BLD AUTO: 0.1 % (ref 0–0.43)
LYMPHOCYTES # BLD AUTO: 1.11 X10(3)/MCL (ref 0.6–4.6)
LYMPHOCYTES NFR BLD AUTO: 15.3 %
MCH RBC QN AUTO: 28.1 PG (ref 27–31)
MCHC RBC AUTO-ENTMCNC: 30.1 MG/DL (ref 33–36)
MCV RBC AUTO: 93.3 FL (ref 80–94)
MONOCYTES # BLD AUTO: 0.6 X10(3)/MCL (ref 0.1–1.3)
MONOCYTES NFR BLD AUTO: 8.3 %
NEUTROPHILS # BLD AUTO: 5.3 X10(3)/MCL (ref 2.1–9.2)
NEUTROPHILS NFR BLD AUTO: 73.2 %
PLATELET # BLD AUTO: 312 X10(3)/MCL (ref 130–400)
PMV BLD AUTO: 9.9 FL (ref 9.4–12.4)
POCT GLUCOSE: 86 MG/DL (ref 70–110)
POTASSIUM SERPL-SCNC: 4.3 MMOL/L (ref 3.5–5.1)
RBC # BLD AUTO: 2.99 X10(6)/MCL (ref 4.2–5.4)
SODIUM SERPL-SCNC: 140 MMOL/L (ref 132–146)
WBC # SPEC AUTO: 7.3 X10(3)/MCL (ref 4.5–11.5)

## 2022-06-28 PROCEDURE — 80048 BASIC METABOLIC PNL TOTAL CA: CPT | Performed by: STUDENT IN AN ORGANIZED HEALTH CARE EDUCATION/TRAINING PROGRAM

## 2022-06-28 PROCEDURE — 82270 OCCULT BLOOD FECES: CPT | Performed by: INTERNAL MEDICINE

## 2022-06-28 PROCEDURE — 36415 COLL VENOUS BLD VENIPUNCTURE: CPT | Performed by: STUDENT IN AN ORGANIZED HEALTH CARE EDUCATION/TRAINING PROGRAM

## 2022-06-28 PROCEDURE — 85025 COMPLETE CBC W/AUTO DIFF WBC: CPT | Performed by: STUDENT IN AN ORGANIZED HEALTH CARE EDUCATION/TRAINING PROGRAM

## 2022-06-28 PROCEDURE — G0378 HOSPITAL OBSERVATION PER HR: HCPCS

## 2022-06-28 PROCEDURE — 25000003 PHARM REV CODE 250: Performed by: STUDENT IN AN ORGANIZED HEALTH CARE EDUCATION/TRAINING PROGRAM

## 2022-06-28 RX ADMIN — MULTIPLE VITAMINS W/ MINERALS TAB 1 TABLET: TAB at 08:06

## 2022-06-28 RX ADMIN — AMLODIPINE BESYLATE 5 MG: 5 TABLET ORAL at 08:06

## 2022-06-28 RX ADMIN — ATORVASTATIN CALCIUM 40 MG: 40 TABLET, FILM COATED ORAL at 08:06

## 2022-06-28 RX ADMIN — VALSARTAN 320 MG: 160 TABLET, FILM COATED ORAL at 08:06

## 2022-06-28 NOTE — SUBJECTIVE & OBJECTIVE
Interval History:  Patient hard of hearing.  However asymptomatic this morning and reports she wants to go home as soon as possible.  Family concerned about keeping the hospital any further as she does get disoriented and sundowns further history.Stable for discharge with outpatient follow up for repeat H/H and with GI.     Review of Systems   Constitutional:  Negative for fatigue and fever.   HENT:  Negative for congestion, sore throat and tinnitus.    Respiratory:  Negative for chest tightness, shortness of breath and wheezing.    Cardiovascular:  Negative for chest pain and leg swelling.   Gastrointestinal:  Negative for diarrhea and rectal pain.   Endocrine: Negative for cold intolerance and heat intolerance.   Genitourinary:  Negative for dysuria and urgency.   Musculoskeletal:  Negative for back pain.   Skin:  Negative for wound.   Neurological:  Negative for dizziness, syncope and weakness.   Hematological:  Does not bruise/bleed easily.   Psychiatric/Behavioral:  Negative for agitation. The patient is not nervous/anxious.    Objective:     Vital Signs (Most Recent):  Temp: 98.5 °F (36.9 °C) (06/28/22 0729)  Pulse: (!) 59 (06/28/22 0729)  Resp: 18 (06/28/22 0427)  BP: (!) 152/55 (06/28/22 0729)  SpO2: 98 % (06/28/22 0729)   Vital Signs (24h Range):  Temp:  [97.6 °F (36.4 °C)-98.5 °F (36.9 °C)] 98.5 °F (36.9 °C)  Pulse:  [52-80] 59  Resp:  [16-18] 18  SpO2:  [97 %-100 %] 98 %  BP: (144-175)/(55-70) 152/55     Weight: 56.1 kg (123 lb 10.9 oz)  Body mass index is 19.96 kg/m².    Intake/Output Summary (Last 24 hours) at 6/28/2022 1013  Last data filed at 6/28/2022 0846  Gross per 24 hour   Intake 1091.25 ml   Output --   Net 1091.25 ml      Physical Exam  Constitutional:       General: She is not in acute distress.     Appearance: She is underweight.   HENT:      Head: Normocephalic and atraumatic.      Comments: Hard of hearing      Nose: No congestion.   Cardiovascular:      Rate and Rhythm: Normal rate and  regular rhythm.      Heart sounds: No murmur heard.  Pulmonary:      Effort: Pulmonary effort is normal.      Breath sounds: Normal breath sounds.   Abdominal:      General: Bowel sounds are normal. There is no distension.      Palpations: Abdomen is soft. There is no mass.      Tenderness: There is no abdominal tenderness.   Musculoskeletal:         General: Normal range of motion.   Skin:     General: Skin is warm.      Findings: No lesion or rash.   Neurological:      General: No focal deficit present.      Cranial Nerves: No cranial nerve deficit.   Psychiatric:         Mood and Affect: Mood normal.         Behavior: Behavior normal.     All pertinent labs within the past 24 hours have been reviewed.  Significant Labs: All pertinent labs within the past 24 hours have been reviewed.    Significant Imaging: I have reviewed all pertinent imaging results/findings within the past 24 hours.

## 2022-06-28 NOTE — HOSPITAL COURSE
Patient is status post 1 unit of PRBCs, hemoglobin up to 8.4 and hematocrit 27.9 this morning.  Patient has remained asymptomatic.  I had a long discussion with her daughter-in-law who helps take care of her.  Daughter-in-law does report she has a history of hemorrhoids, has noticed bright red blood with wiping and has noticed blood in her underwear more towards the rectal area.  I suspect patient does have rectal bleeding however it appears to be very slow. Stool occult is positive. Suspect aspirin and Mobic combination also contributed to worsening bleeding.  At this time she does not seem to be tolerating NSAIDs therefore will discontinue both however I have educated family that patient should follow-up with GI as well as Neurology and Cardiology given that she will be taken off aspirin at this point time.

## 2022-06-28 NOTE — CONSULTS
Inpatient consult to Cardiology  Consult performed by: TYRESE Meléndez  Consult ordered by: Thairy G Reyes, DO        [facility]  Cardiology  Consult Note    Patient Name: Syeda Dunlap  MRN: [id number]  Admission Date: 6/27/2022  Hospital Length of Stay: 0 days  Code Status: Full Code   Attending Provider: No att. providers found   Consulting Provider: TYRESE Meléndez  Primary Care Physician: No primary care provider on file.  Principal Problem:Acute blood loss anemia    Patient information was obtained from patient, past medical records and ER records.     Subjective:     Chief Complaint: Reason for Consult: Anticoagulation Recommendations     HPI: Ms. Dunlap is a 89 y/o female who is known to [facility], Dr. Don. The patient presented to ER on 6.27.22 after being notified by [facility] of a Low H&H (7.2/21.2) drawn in clinic. Upon arrival to the ER the patient had a CBC which revealed a H&H of 8.1/27.1 and she was admitted to Hospital Medicine for Transfusion. She had a + Stool OCB and she was given 1 Unit PRBCs and her H&H resulted at 8.4/27.9. [facility] has been consulted for Anticoagulation Recommendations.     PMH: VHD (Severe AS), HTN, TIA, OMAYRA/Bilaterally Mild, DM II  PSH: Angiogram, Lumpectomy, Appendectomy, Cholecystectomy  Family History: Father, D, 66, MI  Social History: Denies Illicit Drug, ETOH and Tobacco Use    Previous Cardiac Diagnostics:   ECHO 3.7.22:  The study quality is average.   Global left ventricular systolic function is normal. The left ventricular ejection fraction is 65%. Noted left ventricular hypertrophy. It is mild.  Borderline severe aortic valve stenosis is present. The trans-aortic peak velocity is 3.8 m/s. The trans-aortic mean gradient is 31.9 mmHg. Dimensionless index~ 0.22.  Moderately severe calcification of the mitral valve posterior leaflet is noted. Mean mitral gradient is 3.1 mmHg.  Mild (1+) tricuspid regurgitation.   The pulmonary artery  systolic pressure is 28 mmHg.     Carotid US 4.8.21:  The study quality is average.   1-39% stenosis in the proximal right internal carotid artery based on Bluth Criteria.   1-39% stenosis in the proximal left internal carotid artery based on Bluth Criteria.   Antegrade right vertebral artery flow.   Antegrade left vertebral artery flow.     Review of patient's allergies indicates:   Allergen Reactions    Atorvastatin      Cramps    Sulfa (sulfonamide antibiotics)      Questionable in record       No current facility-administered medications on file prior to encounter.     Current Outpatient Medications on File Prior to Encounter   Medication Sig    atorvastatin (LIPITOR) 40 MG tablet Take 1 tablet by mouth once daily.    meloxicam (MOBIC) 15 MG tablet Take 1 tablet by mouth once daily.    amLODIPine (NORVASC) 5 MG tablet Take 5 mg by mouth once daily.    aspirin (ECOTRIN) 81 MG EC tablet Take 81 mg by mouth.    atorvastatin (LIPITOR) 40 MG tablet Take 40 mg by mouth once daily.    meloxicam (MOBIC) 15 MG tablet Take 15 mg by mouth once daily.    multivit/folic acid/vit K1 (ONE-A-DAY WOMEN'S 50 PLUS ORAL) Take 1 tablet by mouth once daily.    olmesartan (BENICAR) 40 MG tablet Take 40 mg by mouth once daily.     Review of Systems:  Review of Systems   Constitutional: Negative for fatigue.   Respiratory: Negative for chest tightness and shortness of breath.    Cardiovascular: Negative for leg swelling.   Gastrointestinal: Negative for blood in stool.   All other systems reviewed and are negative.    Objective:     Vital Signs (Most Recent):  Temp: 98.5 °F (36.9 °C) (06/28/22 0729)  Pulse: (!) 59 (06/28/22 0729)  Resp: 18 (06/28/22 0427)  BP: (!) 152/55 (06/28/22 0729)  SpO2: 98 % (06/28/22 0729) Vital Signs (24h Range):  Temp:  [97.6 °F (36.4 °C)-98.5 °F (36.9 °C)] 98.5 °F (36.9 °C)  Pulse:  [52-80] 59  Resp:  [16-18] 18  SpO2:  [97 %-100 %] 98 %  BP: (144-175)/(55-70) 152/55     Weight: 56.1 kg (123 lb 10.9  oz)  Body mass index is 19.96 kg/m².    SpO2: 98 %  O2 Device (Oxygen Therapy): room air      Intake/Output Summary (Last 24 hours) at 6/28/2022 1000  Last data filed at 6/28/2022 0846  Gross per 24 hour   Intake 1091.25 ml   Output --   Net 1091.25 ml       Lines/Drains/Airways     Peripheral Intravenous Line  Duration                Peripheral IV - Single Lumen 06/27/22 0910 20 G Left Forearm 1 day                  Significant Labs:  Recent Results (from the past 72 hour(s))   CBC with Differential    Collection Time: 06/27/22  9:16 AM   Result Value Ref Range    WBC 6.4 4.5 - 11.5 x10(3)/mcL    RBC 2.52 (L) 4.20 - 5.40 x10(6)/mcL    Hgb 7.2 (L) 12.0 - 16.0 gm/dL    Hct 24.2 (L) 37.0 - 47.0 %    MCV 96.0 (H) 80.0 - 94.0 fL    MCH 28.6 27.0 - 31.0 pg    MCHC 29.8 (L) 33.0 - 36.0 mg/dL    RDW 13.9 11.5 - 17.0 %    Platelet 315 130 - 400 x10(3)/mcL    MPV 9.7 9.4 - 12.4 fL    Neut % 72.4 %    Lymph % 16.3 %    Mono % 8.9 %    Eos % 1.7 %    Basophil % 0.5 %    Lymph # 1.05 0.6 - 4.6 x10(3)/mcL    Neut # 4.7 2.1 - 9.2 x10(3)/mcL    Mono # 0.57 0.1 - 1.3 x10(3)/mcL    Eos # 0.11 0 - 0.9 x10(3)/mcL    Baso # 0.03 0 - 0.2 x10(3)/mcL    IG# 0.01 0 - 0.0155 x10(3)/mcL    IG% 0.2 0 - 0.43 %   Comprehensive metabolic panel    Collection Time: 06/27/22  9:17 AM   Result Value Ref Range    Sodium Level 141 132 - 146 mmol/L    Potassium Level 4.6 3.5 - 5.1 mmol/L    Chloride 107 98 - 111 mmol/L    Carbon Dioxide 24 23 - 31 mmol/L    Glucose Level 130 (H) 75 - 121 mg/dL    Blood Urea Nitrogen 31.8 (H) 9.8 - 20.1 mg/dL    Creatinine 1.16 (H) 0.55 - 1.02 mg/dL    Calcium Level Total 9.4 8.4 - 10.2 mg/dL    Protein Total 6.3 5.8 - 7.6 gm/dL    Albumin Level 2.7 (L) 3.4 - 4.8 gm/dL    Globulin 3.6 (H) 2.4 - 3.5 gm/dL    Albumin/Globulin Ratio 0.8 (L) 1.1 - 2.0 ratio    Bilirubin Total 0.3 <=1.5 mg/dL    Alkaline Phosphatase 83 40 - 150 unit/L    Alanine Aminotransferase 11 0 - 55 unit/L    Aspartate Aminotransferase 26 5 - 34 unit/L     Estimated GFR-Non  46 mls/min/1.73/m2   Type & Screen    Collection Time: 06/27/22 10:10 AM   Result Value Ref Range    Group & Rh O POS     Indirect Ross GEL NEG    Prepare RBC 1 Unit    Collection Time: 06/27/22 10:10 AM   Result Value Ref Range    UNIT NUMBER [id number]     UNIT ABO/RH O POS     DISPENSE STATUS Transfused     Unit Expiration [id number]     Product Code R5401G56     Unit Blood Type Code 5100     CROSSMATCH INTERPRETATION Compatible    COVID-19 Rapid Screening    Collection Time: 06/27/22 11:02 AM   Result Value Ref Range    SARS COV-2 MOLECULAR Negative Negative   ABORH RETYPE    Collection Time: 06/27/22 11:42 AM   Result Value Ref Range    ABORH Retype O POS    POCT glucose    Collection Time: 06/27/22  9:23 PM   Result Value Ref Range    POCT Glucose 121 (H) 70 - 110 mg/dL   POCT Glucose, Hand-Held Device    Collection Time: 06/27/22  9:24 PM   Result Value Ref Range    POC Glucose 121 (A) 70 - 110 MG/DL   CBC with Differential    Collection Time: 06/27/22 10:42 PM   Result Value Ref Range    WBC 6.5 4.5 - 11.5 x10(3)/mcL    RBC 2.91 (L) 4.20 - 5.40 x10(6)/mcL    Hgb 8.1 (L) 12.0 - 16.0 gm/dL    Hct 27.1 (L) 37.0 - 47.0 %    MCV 93.1 80.0 - 94.0 fL    MCH 27.8 27.0 - 31.0 pg    MCHC 29.9 (L) 33.0 - 36.0 mg/dL    RDW 14.5 11.5 - 17.0 %    Platelet 279 130 - 400 x10(3)/mcL    MPV 9.1 (L) 9.4 - 12.4 fL    Neut % 71.1 %    Lymph % 16.9 %    Mono % 8.9 %    Eos % 2.3 %    Basophil % 0.6 %    Lymph # 1.10 0.6 - 4.6 x10(3)/mcL    Neut # 4.6 2.1 - 9.2 x10(3)/mcL    Mono # 0.58 0.1 - 1.3 x10(3)/mcL    Eos # 0.15 0 - 0.9 x10(3)/mcL    Baso # 0.04 0 - 0.2 x10(3)/mcL    IG# 0.01 0 - 0.0155 x10(3)/mcL    IG% 0.2 0 - 0.43 %   POCT glucose    Collection Time: 06/28/22  5:12 AM   Result Value Ref Range    POCT Glucose 86 70 - 110 mg/dL   POCT Glucose, Hand-Held Device    Collection Time: 06/28/22  5:14 AM   Result Value Ref Range    POC Glucose 86 70 - 110 MG/DL   Basic Metabolic  Panel    Collection Time: 06/28/22  6:00 AM   Result Value Ref Range    Sodium Level 140 132 - 146 mmol/L    Potassium Level 4.3 3.5 - 5.1 mmol/L    Chloride 107 98 - 111 mmol/L    Carbon Dioxide 23 23 - 31 mmol/L    Glucose Level 94 75 - 121 mg/dL    Blood Urea Nitrogen 23.0 (H) 9.8 - 20.1 mg/dL    Creatinine 0.95 0.55 - 1.02 mg/dL    BUN/Creatinine Ratio 24     Calcium Level Total 9.5 8.4 - 10.2 mg/dL    Estimated GFR-Non  58 mls/min/1.73/m2    Anion Gap 10.0 mEq/L   CBC with Differential    Collection Time: 06/28/22  7:28 AM   Result Value Ref Range    WBC 7.3 4.5 - 11.5 x10(3)/mcL    RBC 2.99 (L) 4.20 - 5.40 x10(6)/mcL    Hgb 8.4 (L) 12.0 - 16.0 gm/dL    Hct 27.9 (L) 37.0 - 47.0 %    MCV 93.3 80.0 - 94.0 fL    MCH 28.1 27.0 - 31.0 pg    MCHC 30.1 (L) 33.0 - 36.0 mg/dL    RDW 14.6 11.5 - 17.0 %    Platelet 312 130 - 400 x10(3)/mcL    MPV 9.9 9.4 - 12.4 fL    Neut % 73.2 %    Lymph % 15.3 %    Mono % 8.3 %    Eos % 2.5 %    Basophil % 0.6 %    Lymph # 1.11 0.6 - 4.6 x10(3)/mcL    Neut # 5.3 2.1 - 9.2 x10(3)/mcL    Mono # 0.60 0.1 - 1.3 x10(3)/mcL    Eos # 0.18 0 - 0.9 x10(3)/mcL    Baso # 0.04 0 - 0.2 x10(3)/mcL    IG# 0.01 0 - 0.0155 x10(3)/mcL    IG% 0.1 0 - 0.43 %   Occult Blood Stool, CA Screen    Collection Time: 06/28/22  8:09 AM   Result Value Ref Range    Occult Blood Stool 1 Positive (A) Negative     Last Lipids:  Lab Results   Component Value Date    CHOL 128 02/22/2021    CHOL 125 02/13/2020    CHOL 137 08/14/2019     Lab Results   Component Value Date    HDL 68 (H) 02/22/2021    HDL 73 (H) 02/13/2020    HDL 84 (H) 08/14/2019     No results found for: LDLCALC  Lab Results   Component Value Date    TRIG 70 02/22/2021    TRIG 88 02/13/2020    TRIG 78 08/14/2019     Telemetry: SB 50s    Physical Exam  Constitutional:       Appearance: Normal appearance.   HENT:      Head: Normocephalic.      Mouth/Throat:      Mouth: Mucous membranes are moist.   Eyes:      Extraocular Movements:  Extraocular movements intact.   Cardiovascular:      Rate and Rhythm: Regular rhythm. Bradycardia present.      Pulses: Normal pulses.      Heart sounds: Murmur heard.    Systolic murmur is present with a grade of 4/6.  Pulmonary:      Effort: Pulmonary effort is normal.      Breath sounds: Normal breath sounds.   Abdominal:      Palpations: Abdomen is soft.   Musculoskeletal:         General: No swelling. Normal range of motion.   Skin:     General: Skin is warm and dry.   Neurological:      General: No focal deficit present.      Mental Status: She is alert and oriented to person, place, and time.   Psychiatric:         Mood and Affect: Mood normal.         Behavior: Behavior normal.         Home Medications:   No current facility-administered medications on file prior to encounter.     Current Outpatient Medications on File Prior to Encounter   Medication Sig Dispense Refill    atorvastatin (LIPITOR) 40 MG tablet Take 1 tablet by mouth once daily.      meloxicam (MOBIC) 15 MG tablet Take 1 tablet by mouth once daily.      amLODIPine (NORVASC) 5 MG tablet Take 5 mg by mouth once daily.      aspirin (ECOTRIN) 81 MG EC tablet Take 81 mg by mouth.      atorvastatin (LIPITOR) 40 MG tablet Take 40 mg by mouth once daily.      meloxicam (MOBIC) 15 MG tablet Take 15 mg by mouth once daily.      multivit/folic acid/vit K1 (ONE-A-DAY WOMEN'S 50 PLUS ORAL) Take 1 tablet by mouth once daily.      olmesartan (BENICAR) 40 MG tablet Take 40 mg by mouth once daily.         Current Inpatient Medications:    Current Facility-Administered Medications:     0.9%  NaCl infusion (for blood administration), , Intravenous, Q24H PRN, Thairy G Reyes, DO    acetaminophen tablet 650 mg, 650 mg, Oral, Q4H PRN, Thairy G Reyes, DO    acetaminophen tablet 650 mg, 650 mg, Oral, Q8H PRN, Thairy G Reyes, DO    albuterol-ipratropium 2.5 mg-0.5 mg/3 mL nebulizer solution 3 mL, 3 mL, Nebulization, Q6H PRN, Thairy G Reyes, DO    ALPRAZolam  tablet 0.25 mg, 0.25 mg, Oral, Nightly PRN, Thairy G Reyes, DO    amLODIPine tablet 5 mg, 5 mg, Oral, Daily, Thairy G Reyes, DO, 5 mg at 06/28/22 0852    atorvastatin tablet 40 mg, 40 mg, Oral, Daily, Thairy G Reyes, DO, 40 mg at 06/28/22 0852    dextrose 10% bolus 125 mL, 12.5 g, Intravenous, PRN, Thairy G Reyes, DO    dextrose 10% bolus 250 mL, 25 g, Intravenous, PRN, Thairy G Reyes, DO    glucagon (human recombinant) injection 1 mg, 1 mg, Intramuscular, PRN, Thairy G Reyes, DO    glucose chewable tablet 16 g, 16 g, Oral, PRN, Thairy G Reyes, DO    glucose chewable tablet 24 g, 24 g, Oral, PRN, Thairy G Reyes, DO    insulin aspart U-100 injection 0-5 Units, 0-5 Units, Subcutaneous, QID (AC + HS) PRN, Thairy G Reyes, DO    melatonin tablet 6 mg, 6 mg, Oral, Nightly PRN, Thairy G Reyes, DO    multivitamin tablet, 1 tablet, Oral, Daily, Thairy G Reyes, DO, 1 tablet at 06/28/22 0852    ondansetron injection 4 mg, 4 mg, Intravenous, Q8H PRN, Thairy G Reyes, DO    polyethylene glycol packet 17 g, 17 g, Oral, BID PRN, Thairy G Reyes, DO    sodium chloride 0.9% flush 2 mL, 2 mL, Intravenous, Q12H PRN, Thairy G Reyes, DO    valsartan tablet 320 mg, 320 mg, Oral, Daily, Thairy G Reyes, DO, 320 mg at 06/28/22 0852         VTE Risk Mitigation (From admission, onward)         Ordered     Reason for No Pharmacological VTE Prophylaxis  Once        Question:  Reasons:  Answer:  Active Bleeding    06/27/22 2108     IP VTE HIGH RISK PATIENT  Once         06/27/22 2108     Place sequential compression device  Until discontinued         06/27/22 1102              Assessment:   Acute Blood Loss Anemia - Improved s/p Transfusion  TOO - Resolved  VHD - Severe AS (Followed by Dr. Don)  OMAYRA/Bilaterally Mild  Hx of TIA  DM II  HTN    Plan:   PT was on ASA as she has a History of OMAYRA/Bilaterally Mild and TIA  At this Point Benefits Outweigh the Risks and ASA is Ok to be Held  Risk of ASA Cessation Discussed with PT and PTs  Family and are Ok with Risks given PTs advanced age.  Recommend F/U with GI as OP for Options for Treatment  Recommend PPI upon D/C   F/U with [facility] in 1 week  We will be available as needed    Thank you for your consult.     Carter Wilkins, TYRESE  Cardiology  [facility]  06/28/2022 10:00 AM

## 2022-06-28 NOTE — SUBJECTIVE & OBJECTIVE
History reviewed. No pertinent past medical history.    History reviewed. No pertinent surgical history.    Review of patient's allergies indicates:   Allergen Reactions    Atorvastatin      Cramps    Sulfa (sulfonamide antibiotics)      Questionable in record       No current facility-administered medications on file prior to encounter.     Current Outpatient Medications on File Prior to Encounter   Medication Sig    atorvastatin (LIPITOR) 40 MG tablet Take 1 tablet by mouth once daily.    meloxicam (MOBIC) 15 MG tablet Take 1 tablet by mouth once daily.    amLODIPine (NORVASC) 5 MG tablet Take 5 mg by mouth once daily.    aspirin (ECOTRIN) 81 MG EC tablet Take 81 mg by mouth.    atorvastatin (LIPITOR) 40 MG tablet Take 40 mg by mouth once daily.    meloxicam (MOBIC) 15 MG tablet Take 15 mg by mouth once daily.    multivit/folic acid/vit K1 (ONE-A-DAY WOMEN'S 50 PLUS ORAL) Take 1 tablet by mouth once daily.    olmesartan (BENICAR) 40 MG tablet Take 40 mg by mouth once daily.    [DISCONTINUED] amLODIPine (NORVASC) 2.5 MG tablet Take 2.5 mg by mouth once daily.     Family History    None       Tobacco Use    Smoking status: Not on file    Smokeless tobacco: Not on file   Substance and Sexual Activity    Alcohol use: Not on file    Drug use: Not on file    Sexual activity: Not on file     Review of Systems   Constitutional:  Negative for fatigue and fever.   HENT:  Negative for congestion, ear pain, sinus pain and sore throat.    Eyes:  Negative for pain, discharge and redness.   Respiratory:  Negative for cough, shortness of breath and wheezing.    Cardiovascular:  Negative for chest pain, palpitations and leg swelling.   Gastrointestinal:  Negative for abdominal pain, diarrhea, nausea and vomiting.   Endocrine: Negative for cold intolerance and heat intolerance.   Genitourinary:  Negative for dysuria, frequency and urgency.   Musculoskeletal:  Negative for back pain, joint swelling and neck pain.   Skin:  Negative  for rash.   Neurological:  Negative for dizziness, weakness, numbness and headaches.   Hematological:  Does not bruise/bleed easily.   Objective:     Vital Signs (Most Recent):  Temp: 97.9 °F (36.6 °C) (06/27/22 1715)  Pulse: 64 (06/27/22 1715)  Resp: 18 (06/27/22 1300)  BP: (!) 153/68 (06/27/22 1715)  SpO2: 100 % (06/27/22 1715)   Vital Signs (24h Range):  Temp:  [97.6 °F (36.4 °C)-98.3 °F (36.8 °C)] 97.9 °F (36.6 °C)  Pulse:  [52-71] 64  Resp:  [18-20] 18  SpO2:  [98 %-100 %] 100 %  BP: (146-167)/(56-70) 153/68     Weight: 56.1 kg (123 lb 10.9 oz)  Body mass index is 19.96 kg/m².    Physical Exam  Constitutional:       General: She is not in acute distress.     Appearance: Normal appearance. She is normal weight.   HENT:      Mouth/Throat:      Mouth: Mucous membranes are moist.      Pharynx: Oropharynx is clear. No oropharyngeal exudate or posterior oropharyngeal erythema.   Eyes:      Extraocular Movements: Extraocular movements intact.      Conjunctiva/sclera: Conjunctivae normal.      Pupils: Pupils are equal, round, and reactive to light.   Neck:      Thyroid: No thyromegaly.   Cardiovascular:      Rate and Rhythm: Normal rate and regular rhythm.      Heart sounds: Murmur heard.   Crescendo systolic murmur is present with a grade of 3/6.     No friction rub. No gallop.   Pulmonary:      Breath sounds: Normal breath sounds.   Abdominal:      General: Bowel sounds are normal. There is no distension.      Palpations: Abdomen is soft.      Tenderness: There is no abdominal tenderness.   Lymphadenopathy:      Cervical: No cervical adenopathy.   Skin:     General: Skin is warm.      Findings: No rash.   Neurological:      General: No focal deficit present.      Mental Status: She is oriented to person, place, and time.      Cranial Nerves: No cranial nerve deficit.   Psychiatric:         Mood and Affect: Mood is not anxious or depressed. Affect is not inappropriate.         CRANIAL NERVES     CN III, IV, VI    Pupils are equal, round, and reactive to light.     Significant Labs: All pertinent labs within the past 24 hours have been reviewed.    Significant Imaging: I have reviewed all pertinent imaging results/findings within the past 24 hours.

## 2022-06-28 NOTE — ASSESSMENT & PLAN NOTE
-transfuse 1 unit, repeat CBC  -suspect pt on ASA for secondary prevention after CVA however not tolerating given suspicion for slow GI bleed, also utilizing mobiq reguarly, sspect this combo as source of acute blood loss anemia

## 2022-06-28 NOTE — HPI
89 yo female with PMH of NIDDM II, valvulopathy, CVA 2017 (on aspirin), HTN, HLD, chronic pain on mobiq who presents after getting outpatient labs done showing H/H of 7.2/24.2. Pt asymptomatic. Record review reveals pt's Hgb was 10 one year ago. Family has not noted any active bleeding however they do state that at times when changing her sheets have noticed some bright red blood. Unclear if GI in origin. Pt was referred to GI in the not too distant past however it sounds like it was for regular follow up. Family reports shared decision was made to not pursue colonoscopy given life expectancy. No known h/o cancer. Pt  admitted for transfusion and monitoring.

## 2022-06-28 NOTE — PLAN OF CARE
Problem: Adult Inpatient Plan of Care  Goal: Patient-Specific Goal (Individualized)  6/28/2022 0113 by Rebeca Haque RN  Outcome: Ongoing, Progressing  6/28/2022 0110 by Rebeca Haque RN  Outcome: Ongoing, Progressing  6/28/2022 0101 by Rebeca Haque RN  Outcome: Ongoing, Progressing  Flowsheets (Taken 6/28/2022 0101)  Anxieties, Fears or Concerns: none expressed  Individualized Care Needs: blood complete  Patient-Specific Goals (Include Timeframe): h/h improved to 8.1/27.1  Goal: Absence of Hospital-Acquired Illness or Injury  6/28/2022 0113 by Rebeca Haque RN  Outcome: Ongoing, Progressing  6/28/2022 0110 by Rebeca Haque RN  Outcome: Ongoing, Progressing  6/28/2022 0101 by Rebeca Haque RN  Outcome: Ongoing, Progressing  Intervention: Identify and Manage Fall Risk  6/28/2022 0110 by Rebeca Haque RN  Flowsheets (Taken 6/28/2022 0110)  Safety Promotion/Fall Prevention:   assistive device/personal item within reach   bed alarm set   Fall Risk reviewed with patient/family   side rails raised x 3  6/28/2022 0101 by Rebeca Haque RN  Flowsheets (Taken 6/28/2022 0101)  Safety Promotion/Fall Prevention:   side rails raised x 3   bed alarm set   assistive device/personal item within reach  Intervention: Prevent Skin Injury  6/28/2022 0110 by Rebeca Haque RN  Flowsheets (Taken 6/28/2022 0110)  Body Position: position changed independently  6/28/2022 0101 by Rebeca Haque RN  Flowsheets (Taken 6/28/2022 0101)  Body Position: position changed independently  Intervention: Prevent and Manage VTE (Venous Thromboembolism) Risk  6/28/2022 0110 by Rebeca Haque RN  Flowsheets (Taken 6/28/2022 0110)  Activity Management: Walk with assistive devise and /or staff member - L3  6/28/2022 0101 by Rebeca Haque RN  Flowsheets (Taken 6/28/2022 0101)  Activity Management: Walk with assistive devise and /or staff member - L3  Intervention: Prevent Infection  6/28/2022 0110 by Rebeca Haque RN  Flowsheets (Taken 6/28/2022  0110)  Infection Prevention:   hand hygiene promoted   rest/sleep promoted  6/28/2022 0101 by Rebeca Haque RN  Flowsheets (Taken 6/28/2022 0101)  Infection Prevention:   hand hygiene promoted   rest/sleep promoted  Goal: Optimal Comfort and Wellbeing  6/28/2022 0113 by Rebeca Haque RN  Outcome: Ongoing, Progressing  6/28/2022 0110 by Rebeca Haque RN  Outcome: Ongoing, Progressing  6/28/2022 0101 by Rebeca Haque RN  Outcome: Ongoing, Progressing  Intervention: Monitor Pain and Promote Comfort  6/28/2022 0113 by Rebeca Haque RN  Flowsheets (Taken 6/28/2022 0113)  Pain Management Interventions: quiet environment facilitated  6/28/2022 0110 by Rebeca Haque RN  Flowsheets (Taken 6/28/2022 0110)  Pain Management Interventions: quiet environment facilitated  6/28/2022 0101 by Rebeca Haque RN  Flowsheets (Taken 6/28/2022 0101)  Pain Management Interventions: quiet environment facilitated  Intervention: Provide Person-Centered Care  6/28/2022 0113 by Rebeca Haque RN  Flowsheets (Taken 6/28/2022 0113)  Trust Relationship/Rapport:   care explained   questions answered  6/28/2022 0101 by Rebeca Haque RN  Flowsheets (Taken 6/28/2022 0101)  Trust Relationship/Rapport:   care explained   questions answered  Goal: Readiness for Transition of Care  Outcome: Ongoing, Progressing     Problem: Diabetes Comorbidity  Goal: Blood Glucose Level Within Targeted Range  6/28/2022 0113 by Rebeca Haque RN  Outcome: Ongoing, Progressing  6/28/2022 0101 by Rebeca Haque RN  Outcome: Ongoing, Progressing  Intervention: Monitor and Manage Glycemia  6/28/2022 0113 by Rebeca Haque RN  Flowsheets (Taken 6/28/2022 0113)  Glycemic Management: blood glucose monitored  6/28/2022 0101 by Rebeca Haque RN  Flowsheets (Taken 6/28/2022 0101)  Glycemic Management: blood glucose monitored     Problem: Fall Injury Risk  Goal: Absence of Fall and Fall-Related Injury  6/28/2022 0113 by Wytmer Zehner, RN  Outcome: Ongoing,  Progressing  6/28/2022 0101 by Rebeca Haque, RN  Outcome: Ongoing, Progressing

## 2022-06-28 NOTE — ASSESSMENT & PLAN NOTE
Patient with acute kidney injury likely d/t IVVD/Dehydration Which is currently stable. Labs reviewed- Renal function/electrolytes with Estimated Creatinine Clearance: 26.8 mL/min (A) (based on SCr of 1.16 mg/dL (H)). according to latest data. Monitor urine output and serial BMP and adjust therapy as needed. Avoid nephrotoxins and renally dose meds for GFR listed above.     -repeat bmp in am

## 2022-06-28 NOTE — ASSESSMENT & PLAN NOTE
-s/p 1 unit pRBCs  -suspect pt on ASA for secondary prevention after CVA however not tolerating given suspicion for slow GI bleed, also utilizing mobiq regularly, suspect this combo as source of acute blood loss anemia   -occult stool positive

## 2022-06-28 NOTE — ASSESSMENT & PLAN NOTE
-per family, has regular follow up  -told not surgical candidate given age, also agree with conservative management as pt is asymptomatic

## 2022-06-28 NOTE — PLAN OF CARE
[facility]  Discharge Final Note    Primary Care Provider: No primary care provider on file.    Expected Discharge Date: 6/28/2022    Final Discharge Note (most recent)     Final Note - 06/28/22 1355        Final Note    Assessment Type Final Discharge Note     Anticipated Discharge Disposition Home or Self Care     What phone number can be called within the next 1-3 days to see how you are doing after discharge? [phone]     Hospital Resources/Appts/Education Provided Provided patient/caregiver with written discharge plan information        Post-Acute Status    Discharge Delays None known at this time                 Important Message from Medicare             Contact Info     Radha Shane MD   Specialty: Cardiovascular Disease, Cardiology    [address]  [facility] [address]   Phone: [phone]       Next Steps: Follow up    Radha Shane MD   Specialty: Cardiovascular Disease, Cardiology    [address] [facility] [address]   Phone: [phone]       Next Steps: Go to    Instructions: Spoke to Luisa  Tuesday 7/19/22 @10:30 a.mAmanda Arciniega NP   Specialty: Family Medicine    [address] [place] [id number]   Phone: [phone]       Next Steps: Go in 1 week(s)    Instructions: Spoke to Mariangel  Instructions: Go to appointment on 7/5/22 @ 11:20a.m.  Arrive at least 15 minutes before.

## 2022-06-28 NOTE — DISCHARGE INSTRUCTIONS
Follow all D/C instructions as given. Keep all follow up appointments as scheduled.    If you have worsening signs or symptoms please call your health care provider or go to your nearest emergency department.             -Thank you for choosing [facility]!

## 2022-06-28 NOTE — H&P
[facility]  [facility] Medicine  History & Physical    Patient Name: Syeda Dunlap  MRN: [id number]  Patient Class: OP- Observation  Admission Date: 6/27/2022  Attending Physician: No att. providers found   Primary Care Provider: No primary care provider on file.         Patient information was obtained from patient, relative(s) and ER records.     Subjective:     Principal Problem:Acute blood loss anemia    Chief Complaint:   Chief Complaint   Patient presents with    Other Misc     Per son, he was called by [facility] to say that the patient's blood count is low and she needed to get to the ER. Per son, patient has had no symptoms of weakness. Per son, patient has a history of anemia and hemorrhoids.         HPI: 89 yo female with PMH of NIDDM II, valvuloplasty, CVA 2017 (on aspirin), HTN, HLD, chronic pain on mobiq who presents after getting outpatient labs done showing H/H of 7.2/24.2. Pt asymptomatic. Record review reveals pt's Hgb was 10 one year ago. Family has not noted any active bleeding however they do state that at times when changing her sheets have noticed some bright red blood. Unclear if GI in origin. Pt was referred to GI several in the not too distant past however it sounds like it was for regular follow up. Family reports shared decision was made to not pursue colonoscopy given life expectancy. No known h/o cancer. Pt  admitted for transfusion and monitoring.       History reviewed. No pertinent past medical history.    History reviewed. No pertinent surgical history.    Review of patient's allergies indicates:   Allergen Reactions    Atorvastatin      Cramps    Sulfa (sulfonamide antibiotics)      Questionable in record       No current facility-administered medications on file prior to encounter.     Current Outpatient Medications on File Prior to Encounter   Medication Sig    atorvastatin (LIPITOR) 40 MG tablet Take 1 tablet by mouth once daily.    meloxicam  (MOBIC) 15 MG tablet Take 1 tablet by mouth once daily.    amLODIPine (NORVASC) 5 MG tablet Take 5 mg by mouth once daily.    aspirin (ECOTRIN) 81 MG EC tablet Take 81 mg by mouth.    atorvastatin (LIPITOR) 40 MG tablet Take 40 mg by mouth once daily.    meloxicam (MOBIC) 15 MG tablet Take 15 mg by mouth once daily.    multivit/folic acid/vit K1 (ONE-A-DAY WOMEN'S 50 PLUS ORAL) Take 1 tablet by mouth once daily.    olmesartan (BENICAR) 40 MG tablet Take 40 mg by mouth once daily.    [DISCONTINUED] amLODIPine (NORVASC) 2.5 MG tablet Take 2.5 mg by mouth once daily.     Family History    None       Tobacco Use    Smoking status: Not on file    Smokeless tobacco: Not on file   Substance and Sexual Activity    Alcohol use: Not on file    Drug use: Not on file    Sexual activity: Not on file     Review of Systems   Constitutional:  Negative for fatigue and fever.   HENT:  Negative for congestion, ear pain, sinus pain and sore throat.    Eyes:  Negative for pain, discharge and redness.   Respiratory:  Negative for cough, shortness of breath and wheezing.    Cardiovascular:  Negative for chest pain, palpitations and leg swelling.   Gastrointestinal:  Negative for abdominal pain, diarrhea, nausea and vomiting.   Endocrine: Negative for cold intolerance and heat intolerance.   Genitourinary:  Negative for dysuria, frequency and urgency.   Musculoskeletal:  Negative for back pain, joint swelling and neck pain.   Skin:  Negative for rash.   Neurological:  Negative for dizziness, weakness, numbness and headaches.   Hematological:  Does not bruise/bleed easily.   Objective:     Vital Signs (Most Recent):  Temp: 97.9 °F (36.6 °C) (06/27/22 1715)  Pulse: 64 (06/27/22 1715)  Resp: 18 (06/27/22 1300)  BP: (!) 153/68 (06/27/22 1715)  SpO2: 100 % (06/27/22 1715)   Vital Signs (24h Range):  Temp:  [97.6 °F (36.4 °C)-98.3 °F (36.8 °C)] 97.9 °F (36.6 °C)  Pulse:  [52-71] 64  Resp:  [18-20] 18  SpO2:  [98 %-100 %] 100 %  BP:  (146-167)/(56-70) 153/68     Weight: 56.1 kg (123 lb 10.9 oz)  Body mass index is 19.96 kg/m².    Physical Exam  Constitutional:       General: She is not in acute distress.     Appearance: Normal appearance. She is normal weight.   HENT:      Mouth/Throat:      Mouth: Mucous membranes are moist.      Pharynx: Oropharynx is clear. No oropharyngeal exudate or posterior oropharyngeal erythema.   Eyes:      Extraocular Movements: Extraocular movements intact.      Conjunctiva/sclera: Conjunctivae normal.      Pupils: Pupils are equal, round, and reactive to light.   Neck:      Thyroid: No thyromegaly.   Cardiovascular:      Rate and Rhythm: Normal rate and regular rhythm.      Heart sounds: Murmur heard.   Crescendo systolic murmur is present with a grade of 3/6.     No friction rub. No gallop.   Pulmonary:      Breath sounds: Normal breath sounds.   Abdominal:      General: Bowel sounds are normal. There is no distension.      Palpations: Abdomen is soft.      Tenderness: There is no abdominal tenderness.   Lymphadenopathy:      Cervical: No cervical adenopathy.   Skin:     General: Skin is warm.      Findings: No rash.   Neurological:      General: No focal deficit present.      Mental Status: She is oriented to person, place, and time.      Cranial Nerves: No cranial nerve deficit.   Psychiatric:         Mood and Affect: Mood is not anxious or depressed. Affect is not inappropriate.         CRANIAL NERVES     CN III, IV, VI   Pupils are equal, round, and reactive to light.     Significant Labs: All pertinent labs within the past 24 hours have been reviewed.    Significant Imaging: I have reviewed all pertinent imaging results/findings within the past 24 hours.    Assessment/Plan:     * Acute blood loss anemia  -transfuse 1 unit, repeat CBC  -suspect pt on ASA for secondary prevention after CVA however not tolerating given suspicion for slow GI bleed, also utilizing mobiq regularly, suspect this combo as source of  acute blood loss anemia   -occult stool pending  -consult cardio in AM, will discuss discontinuing asa        TOO (acute kidney injury)  Patient with acute kidney injury likely d/t IVVD/Dehydration Which is currently stable. Labs reviewed- Renal function/electrolytes with Estimated Creatinine Clearance: 26.8 mL/min (A) (based on SCr of 1.16 mg/dL (H)). according to latest data. Monitor urine output and serial BMP and adjust therapy as needed. Avoid nephrotoxins and renally dose meds for GFR listed above.     -repeat bmp in am    Type 2 diabetes mellitus, without long-term current use of insulin  -pt's metformin was discontinued as she was having episodes of hypoglycemia  -agree with conservative management  -ss, monitor with CBGs      Cardiac valvulopathy  -per family, has regular follow up  -told not surgical candidate given age, also agree with conservative management as pt is asymptomatic       HTN (hypertension)  -continue home amlodipine, valsartan      admit to observation status under my care  VTE Risk Mitigation (From admission, onward)         Ordered     Reason for No Pharmacological VTE Prophylaxis  Once        Question:  Reasons:  Answer:  Active Bleeding    06/27/22 2108     IP VTE HIGH RISK PATIENT  Once         06/27/22 2108     Place sequential compression device  Until discontinued         06/27/22 1102                   Thairy G Reyes, DO  Department of Hospital Medicine   [facility]

## 2022-06-28 NOTE — ASSESSMENT & PLAN NOTE
-pt's metformin was discontinued as she was having episodes of hypoglycemia  -agree with conservative management  -ss, monitor with CBGs

## 2022-06-28 NOTE — ASSESSMENT & PLAN NOTE
-pt's metformin was discontinued as she was having episodes of hypoglycemia  -agree with conservative management

## 2022-06-28 NOTE — PLAN OF CARE
Problem: Adult Inpatient Plan of Care  Goal: Patient-Specific Goal (Individualized)  Outcome: Met  Flowsheets (Taken 6/28/2022 1040)  Anxieties, Fears or Concerns: none, patient ready to go home  Individualized Care Needs: none stated  Patient-Specific Goals (Include Timeframe): be at home  Goal: Absence of Hospital-Acquired Illness or Injury  Outcome: Met  Intervention: Identify and Manage Fall Risk  Flowsheets (Taken 6/28/2022 1040)  Safety Promotion/Fall Prevention:   assistive device/personal item within reach   bed alarm set   side rails raised x 2  Intervention: Prevent Skin Injury  Flowsheets (Taken 6/28/2022 1040)  Body Position: sitting up in bed  Skin Protection: incontinence pads utilized  Intervention: Prevent and Manage VTE (Venous Thromboembolism) Risk  Flowsheets (Taken 6/28/2022 1040)  Activity Management: Walk with assistive devise and /or staff member - L3  VTE Prevention/Management: fluids promoted  Range of Motion: active ROM (range of motion) encouraged  Intervention: Prevent Infection  Flowsheets (Taken 6/28/2022 1040)  Infection Prevention: hand hygiene promoted  Goal: Optimal Comfort and Wellbeing  Outcome: Met  Intervention: Monitor Pain and Promote Comfort  Flowsheets (Taken 6/28/2022 1040)  Pain Management Interventions:   relaxation techniques promoted   quiet environment facilitated   position adjusted  Intervention: Provide Person-Centered Care  Flowsheets (Taken 6/28/2022 1040)  Trust Relationship/Rapport:   care explained   choices provided   emotional support provided   empathic listening provided   questions answered   questions encouraged   reassurance provided   thoughts/feelings acknowledged  Goal: Readiness for Transition of Care  Outcome: Met  Intervention: Mutually Develop Transition Plan  Flowsheets (Taken 6/28/2022 1040)  Equipment Currently Used at Home: none  Transportation Anticipated: family or friend will provide  Who are your caregiver(s) and their phone number(s)?:  Kervin () [phone]  Communicated CODY with patient/caregiver: Yes  Do you expect to return to your current living situation?: Yes  Do you have help at home or someone to help you manage your care at home?: Yes  Readmission within 30 days?: No  Do you currently have service(s) that help you manage your care at home?: No  Is the pt/caregiver preference to resume services with current agency: No     Problem: Fluid and Electrolyte Imbalance (Acute Kidney Injury/Impairment)  Goal: Fluid and Electrolyte Balance  Outcome: Met  Intervention: Monitor and Manage Fluid and Electrolyte Balance  Flowsheets (Taken 6/28/2022 1040)  Fluid/Electrolyte Management: fluids provided     Problem: Diabetes Comorbidity  Goal: Blood Glucose Level Within Targeted Range  Outcome: Met  Intervention: Monitor and Manage Glycemia  Flowsheets (Taken 6/28/2022 1040)  Glycemic Management:   blood glucose monitored   oral hydration promoted     Problem: Fall Injury Risk  Goal: Absence of Fall and Fall-Related Injury  Outcome: Met  Intervention: Identify and Manage Contributors  Flowsheets (Taken 6/28/2022 1040)  Self-Care Promotion: independence encouraged  Medication Review/Management: medications reviewed  Intervention: Promote Injury-Free Environment  Flowsheets (Taken 6/28/2022 1040)  Safety Promotion/Fall Prevention:   assistive device/personal item within reach   bed alarm set   side rails raised x 2

## 2022-06-28 NOTE — DISCHARGE SUMMARY
[facility]  Hospital Medicine  Discharge Summary      Patient Name: Syeda Dunlap  MRN: [id number]  Patient Class: OP- Observation  Admission Date: 6/27/2022  Hospital Length of Stay: 0 days  Discharge Date and Time:  06/28/2022 10:20 AM  Attending Physician: No att. providers found   Discharging Provider: Thairy G Reyes, DO  Primary Care Provider: No primary care provider on file.      HPI:   89 yo female with PMH of NIDDM II, valvulopathy, CVA 2017 (on aspirin), HTN, HLD, chronic pain on mobiq who presents after getting outpatient labs done showing H/H of 7.2/24.2. Pt asymptomatic. Record review reveals pt's Hgb was 10 one year ago. Family has not noted any active bleeding however they do state that at times when changing her sheets have noticed some bright red blood. Unclear if GI in origin. Pt was referred to GI in the not too distant past however it sounds like it was for regular follow up. Family reports shared decision was made to not pursue colonoscopy given life expectancy. No known h/o cancer. Pt  admitted for transfusion and monitoring.       * No surgery found *      Hospital Course:   Patient is status post 1 unit of PRBCs, hemoglobin up to 8.4 and hematocrit 27.9 this morning.  Patient has remained asymptomatic.  I had a long discussion with her daughter-in-law who helps take care of her.  Daughter-in-law does report she has a history of hemorrhoids, has noticed bright red blood with wiping and has noticed blood in her underwear more towards the rectal area.  I suspect patient does have rectal bleeding however it appears to be very slow. Stool occult is positive. Suspect aspirin and Mobic combination also contributed to worsening bleeding.  At this time she does not seem to be tolerating NSAIDs therefore will discontinue both however I have educated family that patient should follow-up with GI as well as Neurology and Cardiology given that she will be taken off aspirin  at this point time.        Goals of Care Treatment Preferences:  Code Status: Full Code      Consults:   Consults (From admission, onward)        Status Ordering Provider     Inpatient consult to Cardiology  Once        Provider:  Radha Shane MD    Acknowledged REYES, THAIRY G          * Acute blood loss anemia  -s/p 1 unit pRBCs  -suspect pt on ASA for secondary prevention after CVA however not tolerating given suspicion for slow GI bleed, also utilizing mobiq regularly, suspect this combo as source of acute blood loss anemia   -occult stool positive        TOO (acute kidney injury)  -resolved after receiving blood   -suspect 2/2 hypoperfusion     Type 2 diabetes mellitus, without long-term current use of insulin  -pt's metformin was discontinued as she was having episodes of hypoglycemia  -agree with conservative management        Cardiac valvulopathy  -per family, has regular follow up  -told not surgical candidate given age, also agree with conservative management as pt is asymptomatic       HTN (hypertension)  -continue home amlodipine, valsartan  -moderately well controlled       Final Active Diagnoses:    Diagnosis Date Noted POA    PRINCIPAL PROBLEM:  Acute blood loss anemia [D62] 06/27/2022 Yes    TOO (acute kidney injury) [N17.9] 06/27/2022 Yes    Type 2 diabetes mellitus, without long-term current use of insulin [E11.9] 06/27/2022 Yes     Chronic    Cardiac valvulopathy [I38] 06/27/2022 Yes     Chronic    HTN (hypertension) [I10] 06/27/2022 Yes      Problems Resolved During this Admission:       Discharged Condition: stable    Disposition: Home or Self Care    Follow Up:    Patient Instructions:   No discharge procedures on file.    Significant Diagnostic Studies: Labs:   CBC   Recent Labs   Lab 06/27/22  0916 06/27/22  2242 06/28/22  0728   WBC 6.4 6.5 7.3   HGB 7.2* 8.1* 8.4*   HCT 24.2* 27.1* 27.9*    279 312       Pending Diagnostic Studies:     Procedure Component Value Units  Date/Time    OCCULT BLOOD STOOL, CA SCREEN 2ND SPEC [[id number]]     Order Status: Sent Lab Status: No result     Specimen: Stool     Occult Blood, Stool Screening (1 -3) [[id number]]  (Abnormal) Collected: 06/28/22 0809    Order Status: Sent Lab Status: In process Updated: 06/28/22 0812    Specimen: Stool     Narrative:      The following orders were created for panel order Occult Blood, Stool Screening (1 -3).  Procedure                               Abnormality         Status                     ---------                               -----------         ------                     Occult Blood Stool, CA S...[[id number]]  Abnormal            Final result               OCCULT BLOOD STOOL, CA S...[[id number]]                                                   Please view results for these tests on the individual orders.         Medications:  Reconciled Home Medications:      Medication List      CHANGE how you take these medications    amLODIPine 5 MG tablet  Commonly known as: NORVASC  Take 5 mg by mouth once daily.  What changed: Another medication with the same name was removed. Continue taking this medication, and follow the directions you see here.     atorvastatin 40 MG tablet  Commonly known as: LIPITOR  Take 40 mg by mouth once daily.  What changed: Another medication with the same name was removed. Continue taking this medication, and follow the directions you see here.        CONTINUE taking these medications    olmesartan 40 MG tablet  Commonly known as: BENICAR  Take 40 mg by mouth once daily.     ONE-A-DAY WOMEN'S 50 PLUS ORAL  Take 1 tablet by mouth once daily.        STOP taking these medications    aspirin 81 MG EC tablet  Commonly known as: ECOTRIN     meloxicam 15 MG tablet  Commonly known as: MOBIC            Indwelling Lines/Drains at time of discharge:   Lines/Drains/Airways     None                 Time spent on the discharge of patient: 35 minutes         Thairy G Reyes, DO  Department of Hospital  Medicine [facility]

## 2022-06-28 NOTE — NURSING
Discharge instructions given to patient. IV D/C with tip in tact. Patient tolerated well. All belongings given to patients' family members. Patient wheeled to private vehicle by hospital staff. No needs or concerns expressed.

## 2022-06-28 NOTE — ASSESSMENT & PLAN NOTE
-transfuse 1 unit, repeat CBC  -suspect pt on ASA for secondary prevention after CVA however not tolerating given suspicion for slow GI bleed, also utilizing mobiq regularly, suspect this combo as source of acute blood loss anemia   -occult stool pending  -consult cardio in AM, will discuss discontinuing asa

## 2022-06-28 NOTE — PLAN OF CARE
Problem: Adult Inpatient Plan of Care  Goal: Plan of Care Review  Outcome: Ongoing, Not Progressing  Flowsheets (Taken 6/27/2022 2005)  Plan of Care Reviewed With:   patient   spouse  Goal: Patient-Specific Goal (Individualized)  Outcome: Ongoing, Not Progressing  Flowsheets (Taken 6/27/2022 2005)  Anxieties, Fears or Concerns: when she is going home  Individualized Care Needs: 1 unit rbc  Patient-Specific Goals (Include Timeframe): improve h&h  Goal: Absence of Hospital-Acquired Illness or Injury  Outcome: Ongoing, Not Progressing  Intervention: Identify and Manage Fall Risk  Flowsheets (Taken 6/27/2022 2005)  Safety Promotion/Fall Prevention:   bed alarm set   instructed to call staff for mobility   Supervised toileting - stay within arms reach   nonskid shoes/socks when out of bed   family to remain at bedside  Intervention: Prevent Skin Injury  Flowsheets (Taken 6/27/2022 2005)  Body Position: position changed independently  Skin Protection: tubing/devices free from skin contact  Intervention: Prevent and Manage VTE (Venous Thromboembolism) Risk  Flowsheets (Taken 6/27/2022 2005)  Activity Management: Ambulated to bathroom - L4  Intervention: Prevent Infection  Flowsheets (Taken 6/27/2022 2005)  Infection Prevention: hand hygiene promoted  Goal: Optimal Comfort and Wellbeing  Outcome: Ongoing, Not Progressing  Intervention: Monitor Pain and Promote Comfort  Flowsheets (Taken 6/27/2022 2005)  Pain Management Interventions: quiet environment facilitated  Intervention: Provide Person-Centered Care  Flowsheets (Taken 6/27/2022 2005)  Trust Relationship/Rapport:   care explained   choices provided   emotional support provided   empathic listening provided   questions answered   questions encouraged   reassurance provided   thoughts/feelings acknowledged   other (see comments)  Goal: Readiness for Transition of Care  Outcome: Ongoing, Not Progressing

## 2022-07-28 ENCOUNTER — LAB VISIT (OUTPATIENT)
Dept: LAB | Facility: HOSPITAL | Age: 87
End: 2022-07-28
Attending: INTERNAL MEDICINE
Payer: MEDICARE

## 2022-07-28 DIAGNOSIS — R19.5 ABNORMAL FECES: ICD-10-CM

## 2022-07-28 DIAGNOSIS — D53.9 SIMPLE CHRONIC ANEMIA: Primary | ICD-10-CM

## 2022-07-28 LAB
ALBUMIN SERPL-MCNC: 2.4 GM/DL (ref 3.4–4.8)
ALBUMIN/GLOB SERPL: 0.6 RATIO (ref 1.1–2)
ALP SERPL-CCNC: 76 UNIT/L (ref 40–150)
ALT SERPL-CCNC: 8 UNIT/L (ref 0–55)
AST SERPL-CCNC: 14 UNIT/L (ref 5–34)
BASOPHILS # BLD AUTO: 0.03 X10(3)/MCL (ref 0–0.2)
BASOPHILS NFR BLD AUTO: 0.4 %
BILIRUBIN DIRECT+TOT PNL SERPL-MCNC: 0.5 MG/DL
BUN SERPL-MCNC: 23.6 MG/DL (ref 9.8–20.1)
CALCIUM SERPL-MCNC: 9.4 MG/DL (ref 8.4–10.2)
CHLORIDE SERPL-SCNC: 103 MMOL/L (ref 98–111)
CO2 SERPL-SCNC: 26 MMOL/L (ref 23–31)
CREAT SERPL-MCNC: 1.09 MG/DL (ref 0.55–1.02)
EOSINOPHIL # BLD AUTO: 0.11 X10(3)/MCL (ref 0–0.9)
EOSINOPHIL NFR BLD AUTO: 1.5 %
ERYTHROCYTE [DISTWIDTH] IN BLOOD BY AUTOMATED COUNT: 13.7 % (ref 11.5–17)
FERRITIN SERPL-MCNC: 186.6 NG/ML (ref 4.63–204)
FOLATE SERPL-MCNC: 16.3 NG/ML (ref 7–31.4)
GLOBULIN SER-MCNC: 4.1 GM/DL (ref 2.4–3.5)
GLUCOSE SERPL-MCNC: 121 MG/DL (ref 75–121)
HCT VFR BLD AUTO: 27.1 % (ref 37–47)
HGB BLD-MCNC: 8.3 GM/DL (ref 12–16)
IMM GRANULOCYTES # BLD AUTO: 0.01 X10(3)/MCL (ref 0–0.04)
IMM GRANULOCYTES NFR BLD AUTO: 0.1 %
IRON SATN MFR SERPL: 14 % (ref 20–50)
IRON SERPL-MCNC: 28 UG/DL (ref 50–170)
LYMPHOCYTES # BLD AUTO: 0.99 X10(3)/MCL (ref 0.6–4.6)
LYMPHOCYTES NFR BLD AUTO: 13.7 %
MCH RBC QN AUTO: 28.3 PG (ref 27–31)
MCHC RBC AUTO-ENTMCNC: 30.6 MG/DL (ref 33–36)
MCV RBC AUTO: 92.5 FL (ref 80–94)
MONOCYTES # BLD AUTO: 0.58 X10(3)/MCL (ref 0.1–1.3)
MONOCYTES NFR BLD AUTO: 8 %
NEUTROPHILS # BLD AUTO: 5.5 X10(3)/MCL (ref 2.1–9.2)
NEUTROPHILS NFR BLD AUTO: 76.3 %
PLATELET # BLD AUTO: 277 X10(3)/MCL (ref 130–400)
PMV BLD AUTO: 9.3 FL (ref 7.4–10.4)
POTASSIUM SERPL-SCNC: 4.3 MMOL/L (ref 3.5–5.1)
PROT SERPL-MCNC: 6.5 GM/DL (ref 5.8–7.6)
RBC # BLD AUTO: 2.93 X10(6)/MCL (ref 4.2–5.4)
SODIUM SERPL-SCNC: 139 MMOL/L (ref 132–146)
TIBC SERPL-MCNC: 176 UG/DL (ref 70–310)
TIBC SERPL-MCNC: 204 UG/DL (ref 250–450)
TRANSFERRIN SERPL-MCNC: 177 MG/DL
VIT B12 SERPL-MCNC: 511 PG/ML (ref 213–816)
WBC # SPEC AUTO: 7.2 X10(3)/MCL (ref 4.5–11.5)

## 2022-07-28 PROCEDURE — 80053 COMPREHEN METABOLIC PANEL: CPT

## 2022-07-28 PROCEDURE — 83540 ASSAY OF IRON: CPT

## 2022-07-28 PROCEDURE — 82746 ASSAY OF FOLIC ACID SERUM: CPT

## 2022-07-28 PROCEDURE — 82728 ASSAY OF FERRITIN: CPT

## 2022-07-28 PROCEDURE — 82607 VITAMIN B-12: CPT

## 2022-07-28 PROCEDURE — 85025 COMPLETE CBC W/AUTO DIFF WBC: CPT

## 2022-07-28 PROCEDURE — 36415 COLL VENOUS BLD VENIPUNCTURE: CPT

## 2022-09-22 ENCOUNTER — LAB VISIT (OUTPATIENT)
Dept: LAB | Facility: HOSPITAL | Age: 87
End: 2022-09-22
Attending: NURSE PRACTITIONER
Payer: MEDICARE

## 2022-09-22 DIAGNOSIS — I10 ESSENTIAL HYPERTENSION, MALIGNANT: Primary | ICD-10-CM

## 2022-09-22 LAB
ANION GAP SERPL CALC-SCNC: 10 MEQ/L
BUN SERPL-MCNC: 25.1 MG/DL (ref 9.8–20.1)
CALCIUM SERPL-MCNC: 9.2 MG/DL (ref 8.4–10.2)
CHLORIDE SERPL-SCNC: 98 MMOL/L (ref 98–111)
CO2 SERPL-SCNC: 23 MMOL/L (ref 23–31)
CREAT SERPL-MCNC: 0.93 MG/DL (ref 0.55–1.02)
CREAT/UREA NIT SERPL: 27
ERYTHROCYTE [DISTWIDTH] IN BLOOD BY AUTOMATED COUNT: 12.6 % (ref 11.5–17)
GFR SERPLBLD CREATININE-BSD FMLA CKD-EPI: 57 MLS/MIN/1.73/M2
GLUCOSE SERPL-MCNC: 136 MG/DL (ref 75–121)
HCT VFR BLD AUTO: 47.3 % (ref 37–47)
HGB BLD-MCNC: 15.3 GM/DL (ref 12–16)
MCH RBC QN AUTO: 29.9 PG (ref 27–31)
MCHC RBC AUTO-ENTMCNC: 32.3 MG/DL (ref 33–36)
MCV RBC AUTO: 92.6 FL (ref 80–94)
PLATELET # BLD AUTO: 258 X10(3)/MCL (ref 130–400)
PMV BLD AUTO: 9.6 FL (ref 7.4–10.4)
POTASSIUM SERPL-SCNC: 3.3 MMOL/L (ref 3.5–5.1)
RBC # BLD AUTO: 5.11 X10(6)/MCL (ref 4.2–5.4)
SODIUM SERPL-SCNC: 131 MMOL/L (ref 132–146)
WBC # SPEC AUTO: 8.5 X10(3)/MCL (ref 4.5–11.5)

## 2022-09-22 PROCEDURE — 80048 BASIC METABOLIC PNL TOTAL CA: CPT

## 2022-09-22 PROCEDURE — 85027 COMPLETE CBC AUTOMATED: CPT

## 2022-09-22 PROCEDURE — 36415 COLL VENOUS BLD VENIPUNCTURE: CPT

## 2022-11-03 ENCOUNTER — HOSPITAL ENCOUNTER (EMERGENCY)
Facility: HOSPITAL | Age: 87
Discharge: HOME | End: 2022-11-04
Attending: STUDENT IN AN ORGANIZED HEALTH CARE EDUCATION/TRAINING PROGRAM
Payer: MEDICARE

## 2022-11-03 DIAGNOSIS — R79.89 ELEVATED TROPONIN: ICD-10-CM

## 2022-11-03 DIAGNOSIS — I48.91 ATRIAL FIBRILLATION, UNSPECIFIED TYPE: ICD-10-CM

## 2022-11-03 DIAGNOSIS — K52.9 PROCTOCOLITIS: ICD-10-CM

## 2022-11-03 DIAGNOSIS — L89.152 SACRAL DECUBITUS ULCER, STAGE II: ICD-10-CM

## 2022-11-03 DIAGNOSIS — R53.1 WEAKNESS: ICD-10-CM

## 2022-11-03 DIAGNOSIS — E87.6 HYPOKALEMIA: ICD-10-CM

## 2022-11-03 DIAGNOSIS — K92.2 GASTROINTESTINAL HEMORRHAGE, UNSPECIFIED GASTROINTESTINAL HEMORRHAGE TYPE: Primary | ICD-10-CM

## 2022-11-03 LAB
ALBUMIN SERPL-MCNC: 1.6 GM/DL (ref 3.4–4.8)
ALBUMIN/GLOB SERPL: 0.4 RATIO (ref 1.1–2)
ALP SERPL-CCNC: 95 UNIT/L (ref 40–150)
ALT SERPL-CCNC: 23 UNIT/L (ref 0–55)
APPEARANCE UR: ABNORMAL
AST SERPL-CCNC: 37 UNIT/L (ref 5–34)
BACTERIA #/AREA URNS AUTO: ABNORMAL /HPF
BASOPHILS # BLD AUTO: 0.03 X10(3)/MCL (ref 0–0.2)
BASOPHILS NFR BLD AUTO: 0.4 %
BILIRUB UR QL STRIP.AUTO: ABNORMAL MG/DL
BILIRUBIN DIRECT+TOT PNL SERPL-MCNC: 0.6 MG/DL
BUN SERPL-MCNC: 16.3 MG/DL (ref 9.8–20.1)
CALCIUM SERPL-MCNC: 8.3 MG/DL (ref 8.4–10.2)
CHLORIDE SERPL-SCNC: 104 MMOL/L (ref 98–111)
CO2 SERPL-SCNC: 23 MMOL/L (ref 23–31)
COLOR STL: ABNORMAL
COLOR UR AUTO: YELLOW
CONSISTENCY STL: ABNORMAL
CREAT SERPL-MCNC: 1.01 MG/DL (ref 0.55–1.02)
EOSINOPHIL # BLD AUTO: 0.03 X10(3)/MCL (ref 0–0.9)
EOSINOPHIL NFR BLD AUTO: 0.4 %
ERYTHROCYTE [DISTWIDTH] IN BLOOD BY AUTOMATED COUNT: 15.2 % (ref 11.5–17)
FLUAV AG UPPER RESP QL IA.RAPID: NOT DETECTED
FLUBV AG UPPER RESP QL IA.RAPID: NOT DETECTED
GFR SERPLBLD CREATININE-BSD FMLA CKD-EPI: 52 MLS/MIN/1.73/M2
GLOBULIN SER-MCNC: 3.9 GM/DL (ref 2.4–3.5)
GLUCOSE SERPL-MCNC: 118 MG/DL (ref 75–121)
GLUCOSE UR QL STRIP.AUTO: NEGATIVE MG/DL
GROUP & RH: NORMAL
HCT VFR BLD AUTO: 23.7 % (ref 37–47)
HEMOCCULT SP1 STL QL: POSITIVE
HEMOCCULT SP1 STL QL: POSITIVE
HGB BLD-MCNC: 7.6 GM/DL (ref 12–16)
IMM GRANULOCYTES # BLD AUTO: 0.01 X10(3)/MCL (ref 0–0.04)
IMM GRANULOCYTES NFR BLD AUTO: 0.1 %
INDIRECT COOMBS GEL: NORMAL
IRON SATN MFR SERPL: 13 % (ref 20–50)
IRON SERPL-MCNC: 20 UG/DL (ref 50–170)
KETONES UR QL STRIP.AUTO: ABNORMAL MG/DL
LEUKOCYTE ESTERASE UR QL STRIP.AUTO: NEGATIVE UNIT/L
LYMPHOCYTES # BLD AUTO: 1.09 X10(3)/MCL (ref 0.6–4.6)
LYMPHOCYTES NFR BLD AUTO: 16.1 %
MAGNESIUM SERPL-MCNC: 2 MG/DL (ref 1.6–2.6)
MCH RBC QN AUTO: 27.6 PG (ref 27–31)
MCHC RBC AUTO-ENTMCNC: 32.1 MG/DL (ref 33–36)
MCV RBC AUTO: 86.2 FL (ref 80–94)
MONOCYTES # BLD AUTO: 0.48 X10(3)/MCL (ref 0.1–1.3)
MONOCYTES NFR BLD AUTO: 7.1 %
NEUTROPHILS # BLD AUTO: 5.2 X10(3)/MCL (ref 2.1–9.2)
NEUTROPHILS NFR BLD AUTO: 75.9 %
NITRITE UR QL STRIP.AUTO: NEGATIVE
PH UR STRIP.AUTO: 5 [PH]
PLATELET # BLD AUTO: 239 X10(3)/MCL (ref 130–400)
PMV BLD AUTO: 9.9 FL (ref 7.4–10.4)
POC CARDIAC TROPONIN I: 0.12 NG/ML
POC CARDIAC TROPONIN I: 0.15 NG/ML
POTASSIUM SERPL-SCNC: 2.6 MMOL/L (ref 3.5–5.1)
PROT SERPL-MCNC: 5.5 GM/DL (ref 5.8–7.6)
PROT UR QL STRIP.AUTO: NEGATIVE MG/DL
RBC # BLD AUTO: 2.75 X10(6)/MCL (ref 4.2–5.4)
RBC #/AREA URNS AUTO: ABNORMAL /HPF
RBC UR QL AUTO: NEGATIVE UNIT/L
SAMPLE: ABNORMAL
SAMPLE: ABNORMAL
SARS-COV-2 RNA RESP QL NAA+PROBE: NOT DETECTED
SODIUM SERPL-SCNC: 138 MMOL/L (ref 132–146)
SP GR UR STRIP.AUTO: 1.01
SQUAMOUS #/AREA URNS AUTO: ABNORMAL /HPF
TIBC SERPL-MCNC: 131 UG/DL (ref 70–310)
TIBC SERPL-MCNC: 151 UG/DL (ref 250–450)
TRANSFERRIN SERPL-MCNC: 139 MG/DL
TSH SERPL-ACNC: 2.52 UIU/ML (ref 0.35–4.94)
UROBILINOGEN UR STRIP-ACNC: 0.2 MG/DL
WBC # SPEC AUTO: 6.8 X10(3)/MCL (ref 4.5–11.5)
WBC #/AREA URNS AUTO: ABNORMAL /HPF

## 2022-11-03 PROCEDURE — 83735 ASSAY OF MAGNESIUM: CPT | Performed by: STUDENT IN AN ORGANIZED HEALTH CARE EDUCATION/TRAINING PROGRAM

## 2022-11-03 PROCEDURE — 36415 COLL VENOUS BLD VENIPUNCTURE: CPT | Performed by: SPECIALIST

## 2022-11-03 PROCEDURE — 87077 CULTURE AEROBIC IDENTIFY: CPT | Mod: 59 | Performed by: STUDENT IN AN ORGANIZED HEALTH CARE EDUCATION/TRAINING PROGRAM

## 2022-11-03 PROCEDURE — 99285 EMERGENCY DEPT VISIT HI MDM: CPT | Mod: 25,CS

## 2022-11-03 PROCEDURE — 36430 TRANSFUSION BLD/BLD COMPNT: CPT

## 2022-11-03 PROCEDURE — 25500020 PHARM REV CODE 255: Performed by: SPECIALIST

## 2022-11-03 PROCEDURE — 96374 THER/PROPH/DIAG INJ IV PUSH: CPT

## 2022-11-03 PROCEDURE — C9113 INJ PANTOPRAZOLE SODIUM, VIA: HCPCS | Performed by: SPECIALIST

## 2022-11-03 PROCEDURE — 83540 ASSAY OF IRON: CPT | Performed by: SPECIALIST

## 2022-11-03 PROCEDURE — 84443 ASSAY THYROID STIM HORMONE: CPT | Performed by: STUDENT IN AN ORGANIZED HEALTH CARE EDUCATION/TRAINING PROGRAM

## 2022-11-03 PROCEDURE — 63600175 PHARM REV CODE 636 W HCPCS: Performed by: SPECIALIST

## 2022-11-03 PROCEDURE — 96361 HYDRATE IV INFUSION ADD-ON: CPT

## 2022-11-03 PROCEDURE — 25000003 PHARM REV CODE 250: Performed by: STUDENT IN AN ORGANIZED HEALTH CARE EDUCATION/TRAINING PROGRAM

## 2022-11-03 PROCEDURE — 25000003 PHARM REV CODE 250: Performed by: SPECIALIST

## 2022-11-03 PROCEDURE — 82270 OCCULT BLOOD FECES: CPT | Mod: 59 | Performed by: SPECIALIST

## 2022-11-03 PROCEDURE — 80053 COMPREHEN METABOLIC PANEL: CPT | Performed by: STUDENT IN AN ORGANIZED HEALTH CARE EDUCATION/TRAINING PROGRAM

## 2022-11-03 PROCEDURE — 82270 OCCULT BLOOD FECES: CPT | Performed by: STUDENT IN AN ORGANIZED HEALTH CARE EDUCATION/TRAINING PROGRAM

## 2022-11-03 PROCEDURE — 81001 URINALYSIS AUTO W/SCOPE: CPT | Performed by: STUDENT IN AN ORGANIZED HEALTH CARE EDUCATION/TRAINING PROGRAM

## 2022-11-03 PROCEDURE — 86850 RBC ANTIBODY SCREEN: CPT | Performed by: SPECIALIST

## 2022-11-03 PROCEDURE — 85025 COMPLETE CBC W/AUTO DIFF WBC: CPT | Performed by: STUDENT IN AN ORGANIZED HEALTH CARE EDUCATION/TRAINING PROGRAM

## 2022-11-03 PROCEDURE — 84484 ASSAY OF TROPONIN QUANT: CPT

## 2022-11-03 PROCEDURE — 86920 COMPATIBILITY TEST SPIN: CPT | Performed by: SPECIALIST

## 2022-11-03 PROCEDURE — 0241U COVID/FLU A&B PCR: CPT | Performed by: STUDENT IN AN ORGANIZED HEALTH CARE EDUCATION/TRAINING PROGRAM

## 2022-11-03 RX ORDER — ATORVASTATIN CALCIUM 40 MG/1
1 TABLET, FILM COATED ORAL DAILY
COMMUNITY
Start: 2022-11-01

## 2022-11-03 RX ORDER — HYDROCODONE BITARTRATE AND ACETAMINOPHEN 500; 5 MG/1; MG/1
TABLET ORAL
Status: DISCONTINUED | OUTPATIENT
Start: 2022-11-03 | End: 2022-11-04 | Stop reason: HOSPADM

## 2022-11-03 RX ORDER — SODIUM CHLORIDE 9 MG/ML
500 INJECTION, SOLUTION INTRAVENOUS
Status: COMPLETED | OUTPATIENT
Start: 2022-11-03 | End: 2022-11-03

## 2022-11-03 RX ORDER — AMLODIPINE BESYLATE 2.5 MG/1
2.5 TABLET ORAL DAILY
Status: ON HOLD | COMMUNITY
Start: 2022-09-21 | End: 2022-11-06 | Stop reason: HOSPADM

## 2022-11-03 RX ORDER — PANTOPRAZOLE SODIUM 40 MG/10ML
40 INJECTION, POWDER, LYOPHILIZED, FOR SOLUTION INTRAVENOUS
Status: COMPLETED | OUTPATIENT
Start: 2022-11-03 | End: 2022-11-03

## 2022-11-03 RX ORDER — ENOXAPARIN SODIUM 100 MG/ML
50 INJECTION SUBCUTANEOUS
Status: DISCONTINUED | OUTPATIENT
Start: 2022-11-03 | End: 2022-11-03

## 2022-11-03 RX ADMIN — POTASSIUM BICARBONATE 25 MEQ: 978 TABLET, EFFERVESCENT ORAL at 07:11

## 2022-11-03 RX ADMIN — SODIUM CHLORIDE 500 ML: 9 INJECTION, SOLUTION INTRAVENOUS at 05:11

## 2022-11-03 RX ADMIN — PANTOPRAZOLE SODIUM 40 MG: 40 INJECTION, POWDER, LYOPHILIZED, FOR SOLUTION INTRAVENOUS at 06:11

## 2022-11-03 RX ADMIN — IOPAMIDOL 100 ML: 755 INJECTION, SOLUTION INTRAVENOUS at 08:11

## 2022-11-03 NOTE — ED PROVIDER NOTES
"Encounter Date: 11/3/2022       History     Chief Complaint   Patient presents with    Fatigue     Weakness and diarrhea times two weeks decrease appetite      Pt is a 89 yo WF P[facility] of HLD presented to the ED due to "not feeling well" and "tailbone pain."  Son at bedside providing most of the history.  Pt lives with her son who is her primary caregiver.  Son states she has been complaining of tailbone pain for the last 1 week.  Does not have a known history of pressure ulcer but has does sit in her chair most of the day.  Son states mother states that she has been "not feeling well. "Signs states she has not elaborate on what that means but he did ask her if she want to go to the emergency room and she said "yes. "Son denies any recent complaints of fevers, chest pain, shortness of breath, abdominal pain, dysuria.  Patient denies all that at this time as well.  Son states her mental status seems to be about at baseline.  Patient ambulates but extremely slowly presents sign.  Always assisted.  Son states that he feels that she may be a little bit dehydrated.      Review of patient's allergies indicates:   Allergen Reactions    Atorvastatin      Cramps    Sulfa (sulfonamide antibiotics)      Questionable in record     No past medical history on file.  No past surgical history on file.  No family history on file.     Review of Systems   Constitutional:  Positive for fatigue. Negative for chills and fever.   HENT:  Negative for congestion, sore throat and trouble swallowing.    Eyes:  Negative for pain and visual disturbance.   Respiratory:  Negative for cough, shortness of breath and wheezing.    Cardiovascular:  Negative for chest pain and palpitations.   Gastrointestinal:  Negative for abdominal pain, blood in stool, constipation, diarrhea, nausea and vomiting.   Genitourinary:  Negative for dysuria and hematuria.   Musculoskeletal:  Positive for arthralgias. Negative for back pain and myalgias.   Skin:  Negative for " rash and wound.   Neurological:  Negative for seizures, syncope and headaches.   Psychiatric/Behavioral:  Negative for confusion. The patient is not nervous/anxious.      Physical Exam     Initial Vitals [11/03/22 1607]   BP Pulse Resp Temp SpO2   (!) 120/50 (!) 111 20 98.2 °F (36.8 °C) 98 %      MAP       --         Physical Exam    Nursing note and vitals reviewed.  Constitutional: She appears well-developed and well-nourished. She is not diaphoretic. No distress.   HENT:   Head: Normocephalic.   Right Ear: External ear normal.   Left Ear: External ear normal.   Nose: Nose normal.   Eyes: Conjunctivae and EOM are normal. Right eye exhibits no discharge. Left eye exhibits no discharge. No scleral icterus.   Neck:   Normal range of motion.  Cardiovascular:  Normal rate, regular rhythm, normal heart sounds and intact distal pulses.     Exam reveals no gallop and no friction rub.       No murmur heard.  Pulmonary/Chest: Breath sounds normal. No stridor. No respiratory distress. She has no wheezes. She has no rhonchi. She has no rales.   Abdominal: Abdomen is soft. She exhibits no distension. There is no abdominal tenderness. There is no rebound and no guarding.   Genitourinary:    Genitourinary Comments: Stage 2 sacral decub on arrival     Musculoskeletal:         General: No tenderness or edema. Normal range of motion.      Cervical back: Normal range of motion.     Neurological: She is alert. No cranial nerve deficit.   GCS 14 due to confusion, son states at BL   Skin: Skin is warm. No rash noted. No erythema.   Psychiatric: She has a normal mood and affect. Her behavior is normal.       ED Course   Critical Care    Date/Time: 11/3/2022 6:00 PM  Performed by: Mitch Mayfield MD  Authorized by: Mitch Mayfield MD   Direct patient critical care time: 28 minutes  Additional history critical care time: 5 minutes  Ordering / reviewing critical care time: 5 minutes  Documentation critical care time: 10 minutes  Consulting  other physicians critical care time: 5 minutes  Consult with family critical care time: 3 minutes  Total critical care time (exclusive of procedural time) : 56 minutes  Critical care was necessary to treat or prevent imminent or life-threatening deterioration of the following conditions: circulatory failure and shock (Dysrhythmia, GI bleed).  Critical care was time spent personally by me on the following activities: evaluation of patient's response to treatment, examination of patient, obtaining history from patient or surrogate, ordering and performing treatments and interventions, ordering and review of laboratory studies and ordering and review of radiographic studies.      Labs Reviewed   URINALYSIS, REFLEX TO URINE CULTURE - Abnormal; Notable for the following components:       Result Value    Appearance, UA Cloudy (*)     Ketones, UA Trace (*)     Bilirubin, UA Small (*)     All other components within normal limits   COMPREHENSIVE METABOLIC PANEL - Abnormal; Notable for the following components:    Potassium Level 2.6 (*)     Calcium Level Total 8.3 (*)     Protein Total 5.5 (*)     Albumin Level 1.6 (*)     Globulin 3.9 (*)     Albumin/Globulin Ratio 0.4 (*)     Aspartate Aminotransferase 37 (*)     All other components within normal limits   CBC WITH DIFFERENTIAL - Abnormal; Notable for the following components:    RBC 2.75 (*)     Hgb 7.6 (*)     Hct 23.7 (*)     MCHC 32.1 (*)     All other components within normal limits   URINALYSIS, MICROSCOPIC - Abnormal; Notable for the following components:    Bacteria, UA Many (*)     WBC, UA 11-20 (*)     Squamous Epithelial Cells, UA Many (*)     All other components within normal limits   TROPONIN ISTAT - Abnormal; Notable for the following components:    POC Cardiac Troponin I 0.15 (*)     All other components within normal limits   OCCULT BLOOD STOOL, CA SCREEN - Abnormal; Notable for the following components:    Occult Blood Stool 1 Positive (*)     All other  components within normal limits   TROPONIN ISTAT - Abnormal; Notable for the following components:    POC Cardiac Troponin I 0.12 (*)     All other components within normal limits   IRON AND TIBC - Abnormal; Notable for the following components:    Iron Level 20 (*)     Iron Binding Capacity Total 151 (*)     Iron Saturation 13 (*)     All other components within normal limits   OCCULT BLOOD STOOL, CA SCREEN - Abnormal; Notable for the following components:    Occult Blood Stool 1 Positive (*)     All other components within normal limits   COVID/FLU A&B PCR - Normal    Narrative:     The Xpert Xpress SARS-CoV-2/FLU/RSV plus is a rapid, multiplexed real-time PCR test intended for the simultaneous qualitative detection and differentiation of SARS-CoV-2, Influenza A, Influenza B, and respiratory syncytial virus (RSV) viral RNA in either nasopharyngeal swab or nasal swab specimens.         TSH - Normal   MAGNESIUM - Normal   CULTURE, URINE   CBC W/ AUTO DIFFERENTIAL    Narrative:     The following orders were created for panel order CBC Auto Differential.  Procedure                               Abnormality         Status                     ---------                               -----------         ------                     CBC with Differential[[id number]]        Abnormal            Final result                 Please view results for these tests on the individual orders.   OCCULT BLOOD,STOOL,SCREEN (1-3)    Narrative:     The following orders were created for panel order Occult Blood, Stool Screening (1 -3).  Procedure                               Abnormality         Status                     ---------                               -----------         ------                     Occult Blood Stool, CA S...[[id number]]  Abnormal            Final result               OCCULT BLOOD STOOL, CA S...[[id number]]                                                   Please view results for these tests on the individual orders.    OCCULT BLOOD,STOOL,SCREEN (1-3)    Narrative:     The following orders were created for panel order Occult Blood, Stool Screening (1 -3).  Procedure                               Abnormality         Status                     ---------                               -----------         ------                     Occult Blood Stool, CA S...[[id number]]  Abnormal            Final result               OCCULT BLOOD STOOL, CA S...[[id number]]                                                   Please view results for these tests on the individual orders.   OCCULT BLOOD STOOL, CA SCREEN 2ND SPEC   BASIC METABOLIC PANEL   TYPE & SCREEN   POCT TROPONIN   POCT TROPONIN   PREPARE RBC SOFT     EKG Readings: (Independently Interpreted)   Initial Reading: No STEMI. Rhythm: Atrial Fibrillation. Heart Rate: 90. Ectopy: No Ectopy. Conduction: Normal. ST Segments: Normal ST Segments. T Waves: Normal. Axis: Left Axis Deviation.     Imaging Results              CT Abdomen Pelvis With Contrast (Final result)  Result time 11/03/22 20:45:04      Final result by Cam Duong MD (11/03/22 20:45:04)                   Impression:      1. Findings suggestive of proctocolitis.  2. Small bilateral pleural effusions.  3. Small splenic infarct.      Electronically signed by: Cam Duong MD  Date:    11/03/2022  Time:    20:45               Narrative:    EXAMINATION:  CT ABDOMEN AND PELVIS    CLINICAL HISTORY:  Weakness and diarrhea    TECHNIQUE:  Axial CT images were obtained through the abdomen and pelvis following IV administration of 100 cc contrast.  Coronal and sagittal reconstructions submitted and interpreted.  Total .  Automated exposure control utilized.    COMPARISON:  None.    FINDINGS:  Small bilateral pleural effusions are present.    Gallbladder is absent.  Simple renal cysts require no follow-up.  Wedge-shaped hypodensity in the posterior spleen may relate to an infarct.  Pancreas, liver and adrenals are normal.    There  is a small hiatal hernia.  No dilated bowel loops.  Moderate fecal material is in the rectum with mild wall thickening and surrounding inflammation.  No free fluid or pneumoperitoneum.    No enlarged lymph nodes.  Abdominal aorta is normal in caliber.    Urinary bladder is normal.  Uterus is absent.    No acute osseous findings.                                       X-Ray Chest 1 View (Final result)  Result time 11/03/22 19:08:48      Final result by Cam Duong MD (11/03/22 19:08:48)                   Impression:      No acute findings in the chest      Electronically signed by: Cam Duong MD  Date:    11/03/2022  Time:    19:08               Narrative:    EXAMINATION:  XR CHEST 1 VIEW    CLINICAL HISTORY:  fatigue;    COMPARISON:  12/21/2018    FINDINGS:  Single view of the chest shows pulmonary hyperinflation without focal consolidation, pneumothorax or pleural effusion.  Heart is upper normal in size.  Aorta is partially calcified.                                       Medications   0.9%  NaCl infusion (for blood administration) (has no administration in time range)   0.9%  NaCl infusion (0 mLs Intravenous Stopped 11/3/22 1829)   potassium bicarbonate disintegrating tablet 25 mEq (25 mEq Oral Given 11/3/22 1945)   pantoprazole injection 40 mg (40 mg Intravenous Given 11/3/22 1852)   iopamidoL (ISOVUE-370) injection 100 mL (100 mLs Intravenous Given 11/3/22 2042)     Medical Decision Making:   Initial Assessment:   No acute distress 89-year-old female  ED Management:  Stage II sacral acute bone exam   Denies all complaints  AFib new onset?    Rate controlled at 90   Not on anticoagulation   Symptoms concerning for possible anemia given chart review previous anemia   Hemoglobin 7.6 thus will hold off on the Lovenox for now   IV fluids started   Troponin 0.15   Denies chest pain, shortness of breath, nausea, vomiting and diaphoresis   Is AFib secondary to ischemia?    Will consult cards for further  "intervention   Hypokalemia thus replaced  Handed off to Dr. Mayfield at 1800             ED Course as of 11/04/22 0201 Fri Nov 04, 2022   0020 Accepted at [facility] [DD]      ED Course User Index  [DD] Mitch Mayfield MD          Patient Vitals for the past 24 hrs:   BP Temp Temp src Pulse Resp SpO2 Height Weight   11/04/22 0137 136/73 -- -- 104 17 98 % -- --   11/04/22 0122 130/68 -- -- (!) 113 19 (!) 89 % -- --   11/04/22 0115 130/68 98 °F (36.7 °C) -- 76 16 98 % -- --   11/04/22 0022 136/70 -- -- 92 13 95 % -- --   11/04/22 0011 -- -- -- 93 16 96 % -- --   11/04/22 0002 119/77 -- -- (!) 112 19 (!) 92 % -- --   11/03/22 2357 (!) 148/67 -- -- 97 12 99 % -- --   11/03/22 2352 128/63 -- -- 87 15 97 % -- --   11/03/22 2347 (!) 143/66 -- -- 103 18 96 % -- --   11/03/22 2342 132/83 -- -- 109 (!) 21 (!) 91 % -- --   11/03/22 2330 (!) 135/54 97.8 °F (36.6 °C) Oral 93 13 99 % -- --   11/03/22 2327 124/74 -- -- 94 15 99 % -- --   11/03/22 2322 134/69 -- -- 86 12 100 % -- --   11/03/22 2307 122/70 -- -- 72 (!) 21 (!) 89 % -- --   11/03/22 2252 134/62 -- -- 90 18 98 % -- --   11/03/22 2247 101/79 -- -- 81 20 100 % -- --   11/03/22 2241 (!) 134/59 98 °F (36.7 °C) Oral 88 14 100 % -- --   11/03/22 2230 (!) 144/49 -- -- 89 20 99 % -- --   11/03/22 2207 (!) 157/94 97.7 °F (36.5 °C) -- 91 18 100 % -- --   11/03/22 2103 138/66 -- -- 81 18 100 % -- --   11/03/22 2040 (!) 146/81 97.8 °F (36.6 °C) Oral 89 14 97 % -- --   11/03/22 2037 (!) 146/81 -- -- (!) 117 13 100 % -- --   11/03/22 2003 122/75 -- -- 86 15 100 % -- --   11/03/22 1927 129/75 -- -- 82 16 100 % -- --   11/03/22 1841 121/72 -- -- 92 16 100 % -- --   11/03/22 1826 -- 97.8 °F (36.6 °C) Oral -- -- -- -- --   11/03/22 1818 139/85 -- -- 104 20 95 % -- --   11/03/22 1808 -- -- -- 94 19 95 % -- --   11/03/22 1712 -- -- -- 79 -- -- -- --   11/03/22 1628 (!) 133/56 -- -- 85 16 96 % -- --   11/03/22 1607 (!) 120/50 98.2 °F (36.8 °C) Oral (!) 111 20 98 % 5' 6" (1.676 m) " 51.3 kg (113 lb)            Clinical Impression:   Final diagnoses:  [R53.1] Weakness  [K92.2] Gastrointestinal hemorrhage, unspecified gastrointestinal hemorrhage type (Primary)  [I48.91] Atrial fibrillation, unspecified type  [R77.8] Elevated troponin  [E87.6] Hypokalemia  [L89.152] Sacral decubitus ulcer, stage II  [K52.9] Proctocolitis        ED Disposition Condition    Transfer to Another Facility Stable                Mitch Mayfield MD  11/04/22 0208

## 2022-11-04 ENCOUNTER — HOSPITAL ENCOUNTER (INPATIENT)
Facility: HOSPITAL | Age: 87
LOS: 2 days | Discharge: HOME | DRG: 393 | End: 2022-11-06
Attending: INTERNAL MEDICINE | Admitting: INTERNAL MEDICINE
Payer: MEDICARE

## 2022-11-04 VITALS
RESPIRATION RATE: 17 BRPM | HEART RATE: 104 BPM | WEIGHT: 113 LBS | SYSTOLIC BLOOD PRESSURE: 136 MMHG | HEIGHT: 66 IN | TEMPERATURE: 98 F | DIASTOLIC BLOOD PRESSURE: 73 MMHG | BODY MASS INDEX: 18.16 KG/M2 | OXYGEN SATURATION: 98 %

## 2022-11-04 DIAGNOSIS — K92.2 GASTROINTESTINAL HEMORRHAGE, UNSPECIFIED GASTROINTESTINAL HEMORRHAGE TYPE: Primary | ICD-10-CM

## 2022-11-04 DIAGNOSIS — K92.2 GI BLEED: ICD-10-CM

## 2022-11-04 DIAGNOSIS — I48.0 PAF (PAROXYSMAL ATRIAL FIBRILLATION): ICD-10-CM

## 2022-11-04 DIAGNOSIS — I48.91 NEW ONSET A-FIB: ICD-10-CM

## 2022-11-04 DIAGNOSIS — R07.9 CHEST PAIN: ICD-10-CM

## 2022-11-04 PROBLEM — I35.0 AORTIC STENOSIS: Status: ACTIVE | Noted: 2022-11-04

## 2022-11-04 PROBLEM — D50.0 IRON DEFICIENCY ANEMIA DUE TO CHRONIC BLOOD LOSS: Status: ACTIVE | Noted: 2022-06-27

## 2022-11-04 PROBLEM — N39.0 UTI (URINARY TRACT INFECTION): Status: ACTIVE | Noted: 2022-11-04

## 2022-11-04 PROBLEM — Z86.73 HISTORY OF STROKE: Status: ACTIVE | Noted: 2022-11-04

## 2022-11-04 PROBLEM — R79.89 ELEVATED TROPONIN: Status: ACTIVE | Noted: 2022-11-04

## 2022-11-04 PROBLEM — D64.9 SYMPTOMATIC ANEMIA: Status: ACTIVE | Noted: 2022-11-04

## 2022-11-04 LAB
ABO + RH BLD: NORMAL
ALBUMIN SERPL BCP-MCNC: 1.9 G/DL (ref 3.5–5.2)
ALP SERPL-CCNC: 96 U/L (ref 55–135)
ALT SERPL W/O P-5'-P-CCNC: 24 U/L (ref 10–44)
ANION GAP SERPL CALC-SCNC: 8 MMOL/L (ref 8–16)
AORTIC ROOT ANNULUS: 2.62 CM
ASCENDING AORTA: 3.76 CM
AST SERPL-CCNC: 36 U/L (ref 10–40)
AV INDEX (PROSTH): 0.25
AV MEAN GRADIENT: 47 MMHG
AV PEAK GRADIENT: 76 MMHG
AV REGURGITATION PRESSURE HALF TIME: 453.9 MS
AV VALVE AREA: 0.79 CM2
AV VELOCITY RATIO: 0.26
BASOPHILS # BLD AUTO: 0.05 K/UL (ref 0–0.2)
BASOPHILS NFR BLD: 0.6 % (ref 0–1.9)
BILIRUB SERPL-MCNC: 0.9 MG/DL (ref 0.1–1)
BLD PROD TYP BPU: NORMAL
BLOOD UNIT EXPIRATION DATE: NORMAL
BLOOD UNIT TYPE CODE: 5100
BNP SERPL-MCNC: 1558 PG/ML (ref 0–99)
BSA FOR ECHO PROCEDURE: 1.54 M2
BUN SERPL-MCNC: 14 MG/DL (ref 10–30)
CALCIUM SERPL-MCNC: 8.6 MG/DL (ref 8.7–10.5)
CHLORIDE SERPL-SCNC: 106 MMOL/L (ref 95–110)
CO2 SERPL-SCNC: 24 MMOL/L (ref 23–29)
CREAT SERPL-MCNC: 0.8 MG/DL (ref 0.5–1.4)
CROSSMATCH INTERPRETATION: NORMAL
CV ECHO LV RWT: 0.79 CM
DIFFERENTIAL METHOD: ABNORMAL
DISPENSE STATUS: NORMAL
DOP CALC AO PEAK VEL: 4.37 M/S
DOP CALC AO VTI: 124.5 CM
DOP CALC LVOT AREA: 3.2 CM2
DOP CALC LVOT DIAMETER: 2.01 CM
DOP CALC LVOT PEAK VEL: 1.12 M/S
DOP CALC LVOT STROKE VOLUME: 98 CM3
DOP CALC RVOT PEAK VEL: 0.83 M/S
DOP CALC RVOT VTI: 21.9 CM
DOP CALCLVOT PEAK VEL VTI: 30.9 CM
E WAVE DECELERATION TIME: 180.27 MSEC
E/A RATIO: 0.75
E/E' RATIO: 20.71 M/S
ECHO LV POSTERIOR WALL: 1.53 CM (ref 0.6–1.1)
EJECTION FRACTION: 60 %
EOSINOPHIL # BLD AUTO: 0 K/UL (ref 0–0.5)
EOSINOPHIL NFR BLD: 0.5 % (ref 0–8)
ERYTHROCYTE [DISTWIDTH] IN BLOOD BY AUTOMATED COUNT: 15.5 % (ref 11.5–14.5)
EST. GFR  (NO RACE VARIABLE): >60 ML/MIN/1.73 M^2
FRACTIONAL SHORTENING: 32 % (ref 28–44)
GLUCOSE SERPL-MCNC: 72 MG/DL (ref 70–110)
HCT VFR BLD AUTO: 28.7 % (ref 37–48.5)
HGB BLD-MCNC: 9.3 G/DL (ref 12–16)
IMM GRANULOCYTES # BLD AUTO: 0.02 K/UL (ref 0–0.04)
IMM GRANULOCYTES NFR BLD AUTO: 0.2 % (ref 0–0.5)
INTERVENTRICULAR SEPTUM: 1.37 CM (ref 0.6–1.1)
IVC DIAMETER: 1.72 CM
IVRT: 45.67 MSEC
LA MAJOR: 5.14 CM
LA MINOR: 5.12 CM
LA WIDTH: 4.5 CM
LEFT ATRIUM SIZE: 3.34 CM
LEFT ATRIUM VOLUME INDEX MOD: 31.6 ML/M2
LEFT ATRIUM VOLUME INDEX: 41.7 ML/M2
LEFT ATRIUM VOLUME MOD: 49.57 CM3
LEFT ATRIUM VOLUME: 65.54 CM3
LEFT INTERNAL DIMENSION IN SYSTOLE: 2.64 CM (ref 2.1–4)
LEFT VENTRICLE DIASTOLIC VOLUME INDEX: 41.54 ML/M2
LEFT VENTRICLE DIASTOLIC VOLUME: 65.22 ML
LEFT VENTRICLE MASS INDEX: 135 G/M2
LEFT VENTRICLE SYSTOLIC VOLUME INDEX: 16.3 ML/M2
LEFT VENTRICLE SYSTOLIC VOLUME: 25.54 ML
LEFT VENTRICULAR INTERNAL DIMENSION IN DIASTOLE: 3.88 CM (ref 3.5–6)
LEFT VENTRICULAR MASS: 211.31 G
LV LATERAL E/E' RATIO: 18.13 M/S
LV SEPTAL E/E' RATIO: 24.17 M/S
LVOT MG: 3.24 MMHG
LVOT MV: 0.87 CM/S
LYMPHOCYTES # BLD AUTO: 1.7 K/UL (ref 1–4.8)
LYMPHOCYTES NFR BLD: 19.4 % (ref 18–48)
MAGNESIUM SERPL-MCNC: 1.8 MG/DL (ref 1.6–2.6)
MCH RBC QN AUTO: 28.4 PG (ref 27–31)
MCHC RBC AUTO-ENTMCNC: 32.4 G/DL (ref 32–36)
MCV RBC AUTO: 88 FL (ref 82–98)
MONOCYTES # BLD AUTO: 0.6 K/UL (ref 0.3–1)
MONOCYTES NFR BLD: 6.4 % (ref 4–15)
MV PEAK A VEL: 1.93 M/S
MV PEAK E VEL: 1.45 M/S
NEUTROPHILS # BLD AUTO: 6.3 K/UL (ref 1.8–7.7)
NEUTROPHILS NFR BLD: 72.9 % (ref 38–73)
NRBC BLD-RTO: 0 /100 WBC
PHOSPHATE SERPL-MCNC: 1.9 MG/DL (ref 2.7–4.5)
PISA AR MAX VEL: 4.2 M/S
PISA MRMAX VEL: 5.96 M/S
PISA TR MAX VEL: 3.23 M/S
PLATELET # BLD AUTO: 244 K/UL (ref 150–450)
PMV BLD AUTO: 10 FL (ref 9.2–12.9)
POC CARDIAC TROPONIN I: 0.14 NG/ML
POCT GLUCOSE: 77 MG/DL (ref 70–110)
POCT GLUCOSE: 80 MG/DL (ref 70–110)
POTASSIUM SERPL-SCNC: 2.8 MMOL/L (ref 3.5–5.1)
PROCALCITONIN SERPL IA-MCNC: 0.22 NG/ML
PROT SERPL-MCNC: 5.8 G/DL (ref 6–8.4)
PV MEAN GRADIENT: 1.48 MMHG
RA MAJOR: 5.08 CM
RA PRESSURE: 3 MMHG
RA WIDTH: 3 CM
RBC # BLD AUTO: 3.28 M/UL (ref 4–5.4)
RIGHT VENTRICULAR END-DIASTOLIC DIMENSION: 2.93 CM
RV TISSUE DOPPLER FREE WALL SYSTOLIC VELOCITY 1 (APICAL 4 CHAMBER VIEW): 0.01 CM/S
SAMPLE: ABNORMAL
SODIUM SERPL-SCNC: 138 MMOL/L (ref 136–145)
STJ: 2.65 CM
T4 FREE SERPL-MCNC: 1.02 NG/DL (ref 0.71–1.51)
TDI LATERAL: 0.08 M/S
TDI SEPTAL: 0.06 M/S
TDI: 0.07 M/S
TR MAX PG: 42 MMHG
TRICUSPID ANNULAR PLANE SYSTOLIC EXCURSION: 2.1 CM
TROPONIN I SERPL DL<=0.01 NG/ML-MCNC: 0.24 NG/ML (ref 0–0.03)
TROPONIN I SERPL DL<=0.01 NG/ML-MCNC: 0.29 NG/ML (ref 0–0.03)
TSH SERPL DL<=0.005 MIU/L-ACNC: 2.48 UIU/ML (ref 0.4–4)
TV REST PULMONARY ARTERY PRESSURE: 45 MMHG
UNIT NUMBER: NORMAL
WBC # BLD AUTO: 8.62 K/UL (ref 3.9–12.7)

## 2022-11-04 PROCEDURE — 93005 ELECTROCARDIOGRAM TRACING: CPT

## 2022-11-04 PROCEDURE — 85025 COMPLETE CBC W/AUTO DIFF WBC: CPT | Performed by: NURSE PRACTITIONER

## 2022-11-04 PROCEDURE — 84439 ASSAY OF FREE THYROXINE: CPT | Performed by: NURSE PRACTITIONER

## 2022-11-04 PROCEDURE — 84145 PROCALCITONIN (PCT): CPT | Performed by: INTERNAL MEDICINE

## 2022-11-04 PROCEDURE — 11000001 HC ACUTE MED/SURG PRIVATE ROOM

## 2022-11-04 PROCEDURE — 94761 N-INVAS EAR/PLS OXIMETRY MLT: CPT

## 2022-11-04 PROCEDURE — 93010 ELECTROCARDIOGRAM REPORT: CPT | Mod: ,,, | Performed by: INTERNAL MEDICINE

## 2022-11-04 PROCEDURE — 25000003 PHARM REV CODE 250: Performed by: NURSE PRACTITIONER

## 2022-11-04 PROCEDURE — G0008 ADMIN INFLUENZA VIRUS VAC: HCPCS | Performed by: INTERNAL MEDICINE

## 2022-11-04 PROCEDURE — 97116 GAIT TRAINING THERAPY: CPT

## 2022-11-04 PROCEDURE — 83735 ASSAY OF MAGNESIUM: CPT | Performed by: NURSE PRACTITIONER

## 2022-11-04 PROCEDURE — 90694 VACC AIIV4 NO PRSRV 0.5ML IM: CPT | Performed by: INTERNAL MEDICINE

## 2022-11-04 PROCEDURE — 25000003 PHARM REV CODE 250: Performed by: INTERNAL MEDICINE

## 2022-11-04 PROCEDURE — 99223 PR INITIAL HOSPITAL CARE,LEVL III: ICD-10-PCS | Mod: ,,, | Performed by: INTERNAL MEDICINE

## 2022-11-04 PROCEDURE — 93010 EKG 12-LEAD: ICD-10-PCS | Mod: ,,, | Performed by: INTERNAL MEDICINE

## 2022-11-04 PROCEDURE — 84484 ASSAY OF TROPONIN QUANT: CPT | Mod: 91 | Performed by: INTERNAL MEDICINE

## 2022-11-04 PROCEDURE — 84484 ASSAY OF TROPONIN QUANT: CPT | Performed by: NURSE PRACTITIONER

## 2022-11-04 PROCEDURE — 36415 COLL VENOUS BLD VENIPUNCTURE: CPT | Performed by: INTERNAL MEDICINE

## 2022-11-04 PROCEDURE — 21400001 HC TELEMETRY ROOM

## 2022-11-04 PROCEDURE — 99223 PR INITIAL HOSPITAL CARE,LEVL III: ICD-10-PCS | Mod: 25,,, | Performed by: INTERNAL MEDICINE

## 2022-11-04 PROCEDURE — 97162 PT EVAL MOD COMPLEX 30 MIN: CPT

## 2022-11-04 PROCEDURE — 96372 THER/PROPH/DIAG INJ SC/IM: CPT

## 2022-11-04 PROCEDURE — 36415 COLL VENOUS BLD VENIPUNCTURE: CPT | Performed by: NURSE PRACTITIONER

## 2022-11-04 PROCEDURE — 99223 1ST HOSP IP/OBS HIGH 75: CPT | Mod: ,,, | Performed by: INTERNAL MEDICINE

## 2022-11-04 PROCEDURE — 99223 1ST HOSP IP/OBS HIGH 75: CPT | Mod: 25,,, | Performed by: INTERNAL MEDICINE

## 2022-11-04 PROCEDURE — 83880 ASSAY OF NATRIURETIC PEPTIDE: CPT | Performed by: INTERNAL MEDICINE

## 2022-11-04 PROCEDURE — 80053 COMPREHEN METABOLIC PANEL: CPT | Performed by: NURSE PRACTITIONER

## 2022-11-04 PROCEDURE — 97530 THERAPEUTIC ACTIVITIES: CPT

## 2022-11-04 PROCEDURE — 90471 IMMUNIZATION ADMIN: CPT | Performed by: INTERNAL MEDICINE

## 2022-11-04 PROCEDURE — 63600175 PHARM REV CODE 636 W HCPCS: Performed by: INTERNAL MEDICINE

## 2022-11-04 PROCEDURE — 84443 ASSAY THYROID STIM HORMONE: CPT | Performed by: NURSE PRACTITIONER

## 2022-11-04 PROCEDURE — 97166 OT EVAL MOD COMPLEX 45 MIN: CPT

## 2022-11-04 PROCEDURE — 84100 ASSAY OF PHOSPHORUS: CPT | Performed by: NURSE PRACTITIONER

## 2022-11-04 RX ORDER — SODIUM,POTASSIUM PHOSPHATES 280-250MG
2 POWDER IN PACKET (EA) ORAL
Status: DISCONTINUED | OUTPATIENT
Start: 2022-11-04 | End: 2022-11-06 | Stop reason: HOSPADM

## 2022-11-04 RX ORDER — MAG HYDROX/ALUMINUM HYD/SIMETH 200-200-20
30 SUSPENSION, ORAL (FINAL DOSE FORM) ORAL 4 TIMES DAILY PRN
Status: DISCONTINUED | OUTPATIENT
Start: 2022-11-04 | End: 2022-11-06 | Stop reason: HOSPADM

## 2022-11-04 RX ORDER — SODIUM,POTASSIUM PHOSPHATES 280-250MG
2 POWDER IN PACKET (EA) ORAL 3 TIMES DAILY
Status: COMPLETED | OUTPATIENT
Start: 2022-11-04 | End: 2022-11-04

## 2022-11-04 RX ORDER — ATORVASTATIN CALCIUM 40 MG/1
40 TABLET, FILM COATED ORAL DAILY
Status: DISCONTINUED | OUTPATIENT
Start: 2022-11-04 | End: 2022-11-06 | Stop reason: HOSPADM

## 2022-11-04 RX ORDER — POTASSIUM CHLORIDE 20 MEQ/1
40 TABLET, EXTENDED RELEASE ORAL EVERY 4 HOURS
Status: DISCONTINUED | OUTPATIENT
Start: 2022-11-04 | End: 2022-11-04

## 2022-11-04 RX ORDER — NALOXONE HCL 0.4 MG/ML
0.02 VIAL (ML) INJECTION
Status: DISCONTINUED | OUTPATIENT
Start: 2022-11-04 | End: 2022-11-06 | Stop reason: HOSPADM

## 2022-11-04 RX ORDER — PROCHLORPERAZINE EDISYLATE 5 MG/ML
5 INJECTION INTRAMUSCULAR; INTRAVENOUS EVERY 6 HOURS PRN
Status: DISCONTINUED | OUTPATIENT
Start: 2022-11-04 | End: 2022-11-06 | Stop reason: HOSPADM

## 2022-11-04 RX ORDER — GLUCAGON 1 MG
1 KIT INJECTION
Status: DISCONTINUED | OUTPATIENT
Start: 2022-11-04 | End: 2022-11-06 | Stop reason: HOSPADM

## 2022-11-04 RX ORDER — POLYETHYLENE GLYCOL 3350 17 G/17G
17 POWDER, FOR SOLUTION ORAL 2 TIMES DAILY PRN
Status: DISCONTINUED | OUTPATIENT
Start: 2022-11-04 | End: 2022-11-04

## 2022-11-04 RX ORDER — SIMETHICONE 80 MG
1 TABLET,CHEWABLE ORAL 4 TIMES DAILY PRN
Status: DISCONTINUED | OUTPATIENT
Start: 2022-11-04 | End: 2022-11-06 | Stop reason: HOSPADM

## 2022-11-04 RX ORDER — AMLODIPINE BESYLATE 2.5 MG/1
2.5 TABLET ORAL DAILY
Status: DISCONTINUED | OUTPATIENT
Start: 2022-11-04 | End: 2022-11-04

## 2022-11-04 RX ORDER — LANOLIN ALCOHOL/MO/W.PET/CERES
800 CREAM (GRAM) TOPICAL
Status: DISCONTINUED | OUTPATIENT
Start: 2022-11-04 | End: 2022-11-06 | Stop reason: HOSPADM

## 2022-11-04 RX ORDER — LOSARTAN POTASSIUM 25 MG/1
25 TABLET ORAL DAILY
Status: DISCONTINUED | OUTPATIENT
Start: 2022-11-05 | End: 2022-11-04

## 2022-11-04 RX ORDER — ACETAMINOPHEN 325 MG/1
650 TABLET ORAL EVERY 4 HOURS PRN
Status: DISCONTINUED | OUTPATIENT
Start: 2022-11-04 | End: 2022-11-06 | Stop reason: HOSPADM

## 2022-11-04 RX ORDER — DOCUSATE SODIUM 100 MG/1
100 CAPSULE, LIQUID FILLED ORAL 2 TIMES DAILY
Status: DISCONTINUED | OUTPATIENT
Start: 2022-11-04 | End: 2022-11-06 | Stop reason: HOSPADM

## 2022-11-04 RX ORDER — PANTOPRAZOLE SODIUM 40 MG/1
40 TABLET, DELAYED RELEASE ORAL 2 TIMES DAILY
Status: DISCONTINUED | OUTPATIENT
Start: 2022-11-04 | End: 2022-11-06 | Stop reason: HOSPADM

## 2022-11-04 RX ORDER — ONDANSETRON 2 MG/ML
4 INJECTION INTRAMUSCULAR; INTRAVENOUS EVERY 6 HOURS PRN
Status: DISCONTINUED | OUTPATIENT
Start: 2022-11-04 | End: 2022-11-06 | Stop reason: HOSPADM

## 2022-11-04 RX ORDER — HYDROCODONE BITARTRATE AND ACETAMINOPHEN 5; 325 MG/1; MG/1
1 TABLET ORAL EVERY 6 HOURS PRN
Status: DISCONTINUED | OUTPATIENT
Start: 2022-11-04 | End: 2022-11-06 | Stop reason: HOSPADM

## 2022-11-04 RX ORDER — POLYETHYLENE GLYCOL 3350 17 G/17G
17 POWDER, FOR SOLUTION ORAL DAILY
Status: DISCONTINUED | OUTPATIENT
Start: 2022-11-05 | End: 2022-11-06 | Stop reason: HOSPADM

## 2022-11-04 RX ORDER — SODIUM CHLORIDE 0.9 % (FLUSH) 0.9 %
10 SYRINGE (ML) INJECTION EVERY 8 HOURS PRN
Status: DISCONTINUED | OUTPATIENT
Start: 2022-11-04 | End: 2022-11-06 | Stop reason: HOSPADM

## 2022-11-04 RX ORDER — AMOXICILLIN 250 MG
1 CAPSULE ORAL 2 TIMES DAILY PRN
Status: DISCONTINUED | OUTPATIENT
Start: 2022-11-04 | End: 2022-11-06 | Stop reason: HOSPADM

## 2022-11-04 RX ORDER — IBUPROFEN 200 MG
24 TABLET ORAL
Status: DISCONTINUED | OUTPATIENT
Start: 2022-11-04 | End: 2022-11-06 | Stop reason: HOSPADM

## 2022-11-04 RX ORDER — IBUPROFEN 200 MG
16 TABLET ORAL
Status: DISCONTINUED | OUTPATIENT
Start: 2022-11-04 | End: 2022-11-06 | Stop reason: HOSPADM

## 2022-11-04 RX ORDER — IPRATROPIUM BROMIDE AND ALBUTEROL SULFATE 2.5; .5 MG/3ML; MG/3ML
3 SOLUTION RESPIRATORY (INHALATION) EVERY 6 HOURS PRN
Status: DISCONTINUED | OUTPATIENT
Start: 2022-11-04 | End: 2022-11-06 | Stop reason: HOSPADM

## 2022-11-04 RX ORDER — TALC
6 POWDER (GRAM) TOPICAL NIGHTLY PRN
Status: DISCONTINUED | OUTPATIENT
Start: 2022-11-04 | End: 2022-11-06 | Stop reason: HOSPADM

## 2022-11-04 RX ADMIN — METOPROLOL SUCCINATE 12.5 MG: 25 TABLET, EXTENDED RELEASE ORAL at 04:11

## 2022-11-04 RX ADMIN — Medication 2 PACKET: at 11:11

## 2022-11-04 RX ADMIN — INFLUENZA A VIRUS A/VICTORIA/2570/2019 IVR-215 (H1N1) ANTIGEN (FORMALDEHYDE INACTIVATED), INFLUENZA A VIRUS A/DARWIN/6/2021 IVR-227 (H3N2) ANTIGEN (FORMALDEHYDE INACTIVATED), INFLUENZA B VIRUS B/AUSTRIA/1359417/2021 BVR-26 ANTIGEN (FORMALDEHYDE INACTIVATED), INFLUENZA B VIRUS B/PHUKET/3073/2013 BVR-1B ANTIGEN (FORMALDEHYDE INACTIVATED) 0.5 ML: 15; 15; 15; 15 INJECTION, SUSPENSION INTRAMUSCULAR at 04:11

## 2022-11-04 RX ADMIN — PANTOPRAZOLE SODIUM 40 MG: 40 TABLET, DELAYED RELEASE ORAL at 06:11

## 2022-11-04 RX ADMIN — ATORVASTATIN CALCIUM 40 MG: 40 TABLET, FILM COATED ORAL at 11:11

## 2022-11-04 RX ADMIN — MULTIVITAMIN 15 ML: LIQUID ORAL at 11:11

## 2022-11-04 RX ADMIN — Medication 2 PACKET: at 09:11

## 2022-11-04 RX ADMIN — Medication 2 PACKET: at 04:11

## 2022-11-04 RX ADMIN — POTASSIUM BICARBONATE 50 MEQ: 977.5 TABLET, EFFERVESCENT ORAL at 11:11

## 2022-11-04 RX ADMIN — CEFTRIAXONE 1 G: 1 INJECTION, SOLUTION INTRAVENOUS at 11:11

## 2022-11-04 NOTE — PT/OT/SLP EVAL
"Physical Therapy Evaluation    Patient Name:  Syeda Dunlap   MRN:  [id number]    Recommendations:     Discharge Recommendations:  nursing facility, skilled   Discharge Equipment Recommendations:  (TBD)   Barriers to discharge: None    Assessment:     Syeda Dunlap is a 89 y.o. female admitted with a medical diagnosis of GI bleed.  She presents with the following impairments/functional limitations:  gait instability, impaired functional mobility, impaired endurance, weakness, impaired cognition, decreased coordination, decreased safety awareness which limits safe functional mobility.    Rehab Prognosis: Good; patient would benefit from acute skilled PT services to address these deficits and reach maximum level of function.    Recent Surgery: * No surgery found *      Plan:     During this hospitalization, patient to be seen 3 x/week to address the identified rehab impairments via gait training, therapeutic activities, therapeutic exercises, neuromuscular re-education and progress toward the following goals:    Plan of Care Expires:  11/18/22    Subjective     Chief Complaint: GI bleed  Patient/Family Comments/goals: to go home; son at bedside stated "she's well taken care of"  Pain/Comfort:  Pain Rating 1: 0/10    Patients cultural, spiritual, [employer] conflicts given the current situation: no    Living Environment:  Pt's son lives with her in [facility] with 4 steps and bilateral handrails at entry  Prior to admission, patients level of function required supervision for household mobility.  Equipment used at home: shower chair, grab bar.  DME owned (not currently used):  wheelchair in storage and missing foot rests, "hurri-cane", RW. Per son at bedside she ambulates around room using furniture for stability .  Upon discharge, patient will have assistance from family.    Objective:     Communicated with nurse prior to session.  Patient found supine with peripheral IV, telemetry  upon PT entry to room. Son at " bedside    General Precautions: Standard, fall   Orthopedic Precautions:N/A   Braces: N/A  Respiratory Status: Room air    Exams:  RLE ROM: WFL  RLE Strength: WFL  LLE ROM: WFL  LLE Strength: WFL    Functional Mobility:  Bed Mobility:     Supine to Sit: contact guard assistance  Sit to Supine: contact guard assistance  Transfers:     Sit to Stand:  contact guard assistance and minimum assistance with rolling walker  Bed to Chair: contact guard assistance and minimum assistance with  rolling walker  using  Stand Pivot  Gait: ambulated 10ft x 2 min A with RW      AM-PAC 6 CLICK MOBILITY  Total Score:16       Treatment & Education:  Increased cuing needed for safety and to remain on task, pt often wanted to discard RW during mobility. Demonstrates slow pace, flexed posture, wide STEWART, decreased stride length bilaterally    Patient left supine with all lines intact, call button in reach, nursing notified, and son present.    GOALS:   Multidisciplinary Problems       Physical Therapy Goals          Problem: Physical Therapy    Goal Priority Disciplines Outcome Goal Variances Interventions   Physical Therapy Goal     PT, PT/OT      Description: Pt will perform bed mobility independently in order to participate in EOB activity.  Pt will perform transfers independently in order to participate in OOB activity.   Pt will ambulate 25 ft x 2 SPV with LRAD in order to participate in daily tasks.                         History:     Past Medical History:   Diagnosis Date    Anemia, unspecified     Aortic stenosis     CVA (cerebral vascular accident)     Essential (primary) hypertension     Mixed hyperlipidemia        Past Surgical History:   Procedure Laterality Date    APPENDECTOMY      CATARACT EXTRACTION      CHOLECYSTECTOMY      HYSTERECTOMY         Time Tracking:     PT Received On: 11/04/22  PT Start Time: 1405     PT Stop Time: 1430  PT Total Time (min): 25 min     Billable Minutes: Evaluation 10 and Gait Training  15      11/04/2022

## 2022-11-04 NOTE — PROGRESS NOTES
[facility] Medicine  Progress Note    Patient Name: Syeda Dunlap  MRN: [id number]  Patient Class: IP- Inpatient   Admission Date: 11/4/2022  Length of Stay: 0 days  Attending Physician: Rya Hyman MD  Primary Care Provider: Montse Arciniega NP        Subjective:     Principal Problem:GI bleed        HPI:  The patient is a 89 y.o. female with Dementia, CVA- no deficits, Aortic stenosis, HTN, and HLD who presented to [facility] ED with generalized weakness, fatigue, pale skin, and frequent stools. Family unsure if stools are black or bloody.   She was found to have new onset Atrial fibrillation -rate 90. Hemoglobin below baseline at 7.6. Stool positive for occult blood, BP stable. Started on IV protonix and will get 1 unit PRBC transfusion. Potassium has been replaced. Transferred to [facility] for GI evaluation.     Of note, the family reports the pt was found to have anemia 6/2022. She was transfused and followed up with GI who recommended to hold ASA and treat conservatively. Pt was also recently noted to have low blood pressure. PCP recommended to hold home meds Amlodipine and Losartan.     The patient has dementia. Her son, Kervin Dunlap, has POA.     The patient will be placed in observation under hospital medicine           Overview/Hospital Course:  Admitted for further evaluation of increased generalized weakness and anemia with Hgb 7.6 with baseline around 10. No clear h/o melena or bright red blood per rectum this time but stool occult blood is positive. S/P 1 units of P-RBC overnight with repeat Hgb 9.3 this morning. CT abd/pelvis suggestive of moderate fecal material in the rectum  and proctocolitis. GI consulted and suggested lower endoscopy this admit once cleared by Cardiology. Pt is noted to have new onset Atrial fibrillation with controlled rate. Echo done today confirmed severe aortic stenosis with valve area 0.79.       Interval History:   GI consulted and  suggested lower endoscopy this admit once cleared by Cardiology.      Review of Systems   Unable to perform ROS: Other   Constitutional:  Positive for activity change and fatigue. Negative for appetite change and fever.   HENT:  Negative for sore throat.    Eyes:  Negative for visual disturbance.   Respiratory:  Negative for cough, chest tightness and shortness of breath.    Cardiovascular:  Negative for chest pain, palpitations and leg swelling.   Gastrointestinal:  Positive for blood in stool. Negative for abdominal distention, abdominal pain, constipation, diarrhea, nausea and vomiting.   Endocrine: Negative for polyuria.   Genitourinary:  Positive for frequency. Negative for decreased urine volume, dysuria, flank pain and hematuria.   Musculoskeletal:  Negative for back pain and gait problem.   Skin:  Negative for rash.   Neurological:  Negative for syncope, speech difficulty, weakness, light-headedness and headaches.        Dementia    Psychiatric/Behavioral:  Negative for confusion, hallucinations and sleep disturbance.      Son at bedside provide ROS.       Objective:     Vital Signs (Most Recent):  Temp: 97.6 °F (36.4 °C) (11/04/22 1047)  Pulse: 75 (11/04/22 1047)  Resp: 18 (11/04/22 1047)  BP: 138/65 (11/04/22 1047)  SpO2: 100 % (11/04/22 1047) Vital Signs (24h Range):  Temp:  [97.5 °F (36.4 °C)-98.2 °F (36.8 °C)] 97.6 °F (36.4 °C)  Pulse:  [] 75  Resp:  [12-21] 18  SpO2:  [89 %-100 %] 100 %  BP: (101-157)/(49-94) 138/65     Weight: 51.3 kg (113 lb)  Body mass index is 18.24 kg/m².  No intake or output data in the 24 hours ending 11/04/22 1315   Physical Exam  Vitals and nursing note reviewed.   Constitutional:       General: She is not in acute distress.     Appearance: She is well-developed. She is not diaphoretic.   HENT:      Head: Normocephalic and atraumatic.      Nose: Nose normal.   Eyes:      General: No scleral icterus.     Conjunctiva/sclera: Conjunctivae normal.   Neck:      Trachea: No  tracheal deviation.   Cardiovascular:      Rate and Rhythm: Normal rate and regular rhythm.      Heart sounds: Murmur heard.     No friction rub. No gallop.   Pulmonary:      Effort: Pulmonary effort is normal. No respiratory distress.      Breath sounds: Normal breath sounds. No stridor. No wheezing or rales.   Chest:      Chest wall: No tenderness.   Abdominal:      General: Bowel sounds are normal. There is no distension.      Palpations: Abdomen is soft. There is no mass.      Tenderness: There is no abdominal tenderness. There is no guarding or rebound.   Musculoskeletal:         General: No tenderness or deformity. Normal range of motion.      Cervical back: Normal range of motion and neck supple.      Right lower leg: No edema.      Left lower leg: No edema.   Skin:     General: Skin is warm and dry.      Coloration: Skin is pale.      Findings: No erythema or rash.   Neurological:      General: No focal deficit present.      Mental Status: She is alert. Mental status is at baseline.      Cranial Nerves: No cranial nerve deficit.      Motor: No abnormal muscle tone.      Coordination: Coordination normal.      Comments: Oriented to self, person and place   Psychiatric:         Behavior: Behavior normal.         Thought Content: Thought content normal.         Cognition and Memory: Cognition is impaired.       Significant Labs: All pertinent labs within the past 24 hours have been reviewed.  BMP:   Recent Labs   Lab 11/04/22  0547   GLU 72      K 2.8*      CO2 24   BUN 14   CREATININE 0.8   CALCIUM 8.6*   MG 1.8     CBC:   Recent Labs   Lab 11/03/22  1732 11/04/22  0547   WBC 6.8 8.62   HGB 7.6* 9.3*   HCT 23.7* 28.7*    244     CMP:   Recent Labs   Lab 11/03/22 1732 11/04/22  0547    138   K 2.6* 2.8*   CL  --  106   CO2 23 24   GLU  --  72   BUN 16.3 14   CREATININE 1.01 0.8   CALCIUM 8.3* 8.6*   PROT  --  5.8*   ALBUMIN 1.6* 1.9*   BILITOT 0.6 0.9   ALKPHOS 95 96   AST 37* 36   ALT  23 24   ANIONGAP  --  8       Significant Imaging:       Assessment/Plan:      * GI bleed  Serial H/H   GI consult   protonix   11/4-  S/P 1 unit of P-RBC overnight . Hgb 9.3 >7.6  GI consulted and suggested lower endoscopy this admit once cleared by Cardiology      Iron deficiency anemia due to chronic blood loss  Pt received one unit PRBC today   Monitor counts       New onset a-fib  Patient with Paroxysmal (<7 days) atrial fibrillation which is controlled currently with none. Patient is currently in atrial fibrillation.[id number] Score: 4. HASBLED Score: elevated.     Repeat EKG/Troponin   Consult cardiology   Echo       Aortic stenosis  Echo done today confirmed severe aortic stenosis with valve area 0.79      Elevated troponin  -In the setting of anemia and severe aortic stenosis   -Trend troponin   -ASA not offered given GI bleed  Continue statin       History of stroke  ASA on hold due to presumed GI bleed since 6/2022  Cont Statin       UTI (urinary tract infection)  -UA suggestive of UTI  -Rocephin 1 gram IV x 3 doses   -Follow up urine culture         VTE Risk Mitigation (From admission, onward)         Ordered     IP VTE HIGH RISK PATIENT  Once         11/04/22 0542     Place sequential compression device  Until discontinued         11/04/22 0542     Reason for No Pharmacological VTE Prophylaxis  Once        Question:  Reasons:  Answer:  Active Bleeding    11/04/22 0542                Discharge Planning   CODY:      Code Status: Full Code   Is the patient medically ready for discharge?:     Reason for patient still in hospital (select all that apply): Patient trending condition                     Ray Hyman MD  Department of Hospital Medicine   [facility] - Med Surg

## 2022-11-04 NOTE — HPI
The patient presented to the ER for pain in her tailbone and general unwell feeling. She was found to have a sacral decubitus ulcer but WBC count normal and patient afebrile. The patient was also found to have a Hgb of 7.6. Her baseline seems to be around 10, but she was admitted in June 2022 for Hgb around 7 with rectal bleeding. Family opted monitoring over endoscopic evaluation at that time after discussing risks/benefits with a GI provider. This admit, she has been transfused one unit of blood. Hgb this morning is 9.3. BUN and creatinine are normal. She has been off ASA and NSAIDs since June. She reports a history of hemorrhoids. CT abd/pel w/ contrast showed moderate fecal material in the rectum with mild wall thickening and surrounding inflammation suggestive of proctocolitis. The patient has dementia. She denies any symptoms and reports a regular BM once daily. However, her son says she goes to the restroom several times a day, but he is unsure if stools are produced. The patient is also noted to have new Afib. Potassium 2.6 on admit. Troponin 0.288. Her son provides a history of valvular heart disease. Cardiology has been consulted.

## 2022-11-04 NOTE — ED NOTES
Reddened area with slight breakdown to sacral area noted when turning pt and changing. MD aware. Pt c/o tailbone pain. Pt positioned for comfort.

## 2022-11-04 NOTE — SUBJECTIVE & OBJECTIVE
Past Medical History:   Diagnosis Date    Anemia, unspecified     Aortic stenosis     CVA (cerebral vascular accident)     Essential (primary) hypertension     Mixed hyperlipidemia        Past Surgical History:   Procedure Laterality Date    APPENDECTOMY      CATARACT EXTRACTION      CHOLECYSTECTOMY      HYSTERECTOMY         Review of patient's allergies indicates:   Allergen Reactions    Sulfa (sulfonamide antibiotics)      Questionable in record       Current Facility-Administered Medications on File Prior to Encounter   Medication    [COMPLETED] 0.9%  NaCl infusion    [COMPLETED] iopamidoL (ISOVUE-370) injection 100 mL    [COMPLETED] pantoprazole injection 40 mg    [COMPLETED] potassium bicarbonate disintegrating tablet 25 mEq    [DISCONTINUED] 0.9%  NaCl infusion (for blood administration)    [DISCONTINUED] enoxaparin injection 50 mg     Current Outpatient Medications on File Prior to Encounter   Medication Sig    amLODIPine (NORVASC) 2.5 MG tablet Take 2.5 mg by mouth once daily.    atorvastatin (LIPITOR) 40 MG tablet Take 1 tablet by mouth once daily.    cranberry fruit concentrate 250 mg Chew Take by mouth.    multivit/folic acid/vit K1 (ONE-A-DAY WOMEN'S 50 PLUS ORAL) Take 1 tablet by mouth once daily.    olmesartan (BENICAR) 40 MG tablet Take 40 mg by mouth once daily.    [DISCONTINUED] amLODIPine (NORVASC) 5 MG tablet Take 5 mg by mouth once daily.    [DISCONTINUED] aspirin (ECOTRIN) 81 MG EC tablet Take 81 mg by mouth.    [DISCONTINUED] atorvastatin (LIPITOR) 40 MG tablet Take 40 mg by mouth once daily.     Family History       Problem Relation (Age of Onset)    Alzheimer's disease Mother    Heart attack Father          Tobacco Use    Smoking status: Never    Smokeless tobacco: Not on file   Substance and Sexual Activity    Alcohol use: Never    Drug use: Not on file    Sexual activity: Not on file     Review of Systems   Unable to perform ROS: Dementia   Constitutional: Positive for malaise/fatigue.    Hematologic/Lymphatic: Bruises/bleeds easily.   Gastrointestinal:         Frequent stools   Psychiatric/Behavioral:  Positive for memory loss.    Allergic/Immunologic: Negative.    Objective:     Vital Signs (Most Recent):  Temp: 97.6 °F (36.4 °C) (11/04/22 0810)  Pulse: 77 (11/04/22 0810)  Resp: 18 (11/04/22 0810)  BP: 125/66 (11/04/22 0810)  SpO2: 98 % (11/04/22 0810) Vital Signs (24h Range):  Temp:  [97.5 °F (36.4 °C)-98.2 °F (36.8 °C)] 97.6 °F (36.4 °C)  Pulse:  [] 77  Resp:  [12-21] 18  SpO2:  [89 %-100 %] 98 %  BP: (101-157)/(49-94) 125/66     Weight: 51.5 kg (113 lb 8.6 oz)  Body mass index is 18.33 kg/m².    SpO2: 98 %  O2 Device (Oxygen Therapy): room air    No intake or output data in the 24 hours ending 11/04/22 0856    Lines/Drains/Airways       Peripheral Intravenous Line  Duration                  Peripheral IV - Single Lumen 11/04/22 0000 20 G Anterior;Left;Proximal Forearm <1 day                    Physical Exam  Vitals and nursing note reviewed.   Constitutional:       General: She is not in acute distress.     Appearance: She is well-developed. She is not diaphoretic.      Comments: Elderly, frail appearing   HENT:      Head: Normocephalic and atraumatic.   Eyes:      General:         Right eye: No discharge.         Left eye: No discharge.      Pupils: Pupils are equal, round, and reactive to light.   Neck:      Thyroid: No thyromegaly.      Vascular: No JVD.      Trachea: No tracheal deviation.   Cardiovascular:      Rate and Rhythm: Normal rate and regular rhythm. Extrasystoles are present.     Heart sounds: S1 normal and S2 normal. Murmur heard.   Harsh midsystolic murmur is present at the upper right sternal border radiating to the neck.   Pulmonary:      Effort: Pulmonary effort is normal. No respiratory distress.      Breath sounds: Normal breath sounds. No wheezing or rales.   Abdominal:      General: There is no distension.      Palpations: Abdomen is soft.      Tenderness: There  is no rebound.   Musculoskeletal:      Cervical back: Neck supple.      Right lower leg: No edema.      Left lower leg: No edema.   Skin:     General: Skin is warm and dry.      Coloration: Skin is pale.      Findings: No erythema.   Neurological:      General: No focal deficit present.      Mental Status: She is alert and oriented to person, place, and time.   Psychiatric:         Mood and Affect: Mood normal.         Behavior: Behavior normal.         Thought Content: Thought content normal.       Significant Labs: CMP   Recent Labs   Lab 11/03/22 1732 11/04/22  0547    138   K 2.6* 2.8*   CL  --  106   CO2 23 24   GLU  --  72   BUN 16.3 14   CREATININE 1.01 0.8   CALCIUM 8.3* 8.6*   PROT  --  5.8*   ALBUMIN 1.6* 1.9*   BILITOT 0.6 0.9   ALKPHOS 95 96   AST 37* 36   ALT 23 24   ANIONGAP  --  8   , CBC   Recent Labs   Lab 11/03/22 1732 11/04/22  0547   WBC 6.8 8.62   HGB 7.6* 9.3*   HCT 23.7* 28.7*    244   , INR No results for input(s): INR, PROTIME in the last 48 hours., Troponin   Recent Labs   Lab 11/04/22  0547   TROPONINI 0.288*   , and All pertinent lab results from the last 24 hours have been reviewed.    Significant Imaging: Echocardiogram: Transthoracic echo (TTE) complete (Cupid Only): No results found for this or any previous visit., EKG: Reviewed, and X-Ray: CXR: X-Ray Chest 1 View (CXR): No results found for this visit on 11/04/22. and X-Ray Chest PA and Lateral (CXR): No results found for this visit on 11/04/22.

## 2022-11-04 NOTE — HPI
The patient is a 89 y.o. female with Dementia, CVA- no deficits, Aortic stenosis, HTN, and HLD who presented to [facility] ED with generalized weakness, fatigue, pale skin, and frequent stools. Family unsure if stools are black or bloody.   She was found to have new onset Atrial fibrillation -rate 90. Hemoglobin below baseline at 7.6. Stool positive for occult blood, BP stable. Started on IV protonix and will get 1 unit PRBC transfusion. Potassium has been replaced. Transferred to [facility] for GI evaluation.     Of note, the family reports the pt was found to have anemia 6/2022. She was transfused and followed up with GI who recommended to hold ASA and treat conservatively. Pt was also recently noted to have low blood pressure. PCP recommended to hold home meds Amlodipine and Losartan.     The patient has dementia. Her son, Kervin Dunlap, has POA.     The patient will be placed in observation under hospital medicine

## 2022-11-04 NOTE — PLAN OF CARE
SEE EVAL FOR DETAILS. PT DISPLAYED DEFICITS WITH ADL'S SKILLS. DECREASE B UE STRENGTH/ENDURANCE AS WELL AS DECREASE FUNCTIONAL MOBILITY/ TRANSFERS. RECOMMEND: SNF

## 2022-11-04 NOTE — SUBJECTIVE & OBJECTIVE
Past Medical History:   Diagnosis Date    Anemia, unspecified     Aortic stenosis     CVA (cerebral vascular accident)     Essential (primary) hypertension     Mixed hyperlipidemia        Past Surgical History:   Procedure Laterality Date    APPENDECTOMY      CATARACT EXTRACTION      CHOLECYSTECTOMY      HYSTERECTOMY         Review of patient's allergies indicates:   Allergen Reactions    Sulfa (sulfonamide antibiotics)      Questionable in record     Family History       Problem Relation (Age of Onset)    Alzheimer's disease Mother    Heart attack Father          Tobacco Use    Smoking status: Never    Smokeless tobacco: Not on file   Substance and Sexual Activity    Alcohol use: Never    Drug use: Not on file    Sexual activity: Not on file     Review of Systems   Unable to perform ROS: Dementia (She denies sx but I question the accuracy given her dementia)   Objective:     Vital Signs (Most Recent):  Temp: 97.6 °F (36.4 °C) (11/04/22 0810)  Pulse: 77 (11/04/22 0810)  Resp: 18 (11/04/22 0810)  BP: 125/66 (11/04/22 0810)  SpO2: 98 % (11/04/22 0810) Vital Signs (24h Range):  Temp:  [97.5 °F (36.4 °C)-98.2 °F (36.8 °C)] 97.6 °F (36.4 °C)  Pulse:  [] 77  Resp:  [12-21] 18  SpO2:  [89 %-100 %] 98 %  BP: (101-157)/(49-94) 125/66     Weight: 51.5 kg (113 lb 8.6 oz) (11/04/22 0323)  Body mass index is 18.33 kg/m².    No intake or output data in the 24 hours ending 11/04/22 0915    Lines/Drains/Airways       Peripheral Intravenous Line  Duration                  Peripheral IV - Single Lumen 11/04/22 0000 20 G Anterior;Left;Proximal Forearm <1 day                    Physical Exam  Constitutional:       General: She is not in acute distress.     Appearance: Normal appearance. She is well-developed.   HENT:      Head: Normocephalic and atraumatic.   Eyes:      Extraocular Movements: Extraocular movements intact.   Cardiovascular:      Rate and Rhythm: Normal rate. Rhythm irregularly irregular.      Heart sounds: Murmur  heard.   Pulmonary:      Effort: Pulmonary effort is normal. No respiratory distress.      Breath sounds: Normal breath sounds. No wheezing.   Abdominal:      General: Bowel sounds are normal. There is no distension.      Palpations: Abdomen is soft. There is no mass.      Tenderness: There is no abdominal tenderness.   Musculoskeletal:      Cervical back: Normal range of motion and neck supple.      Right lower leg: No edema.      Left lower leg: No edema.   Skin:     General: Skin is warm and dry.      Findings: No rash.   Neurological:      Mental Status: She is alert.      Cranial Nerves: No cranial nerve deficit.      Comments: Oriented to person and place but not time.    Psychiatric:         Behavior: Behavior normal.       Significant Labs:  CBC:   Recent Labs   Lab 11/03/22  1732 11/04/22  0547   WBC 6.8 8.62   HGB 7.6* 9.3*   HCT 23.7* 28.7*    244     CMP:   Recent Labs   Lab 11/04/22  0547   GLU 72   CALCIUM 8.6*   ALBUMIN 1.9*   PROT 5.8*      K 2.8*   CO2 24      BUN 14   CREATININE 0.8   ALKPHOS 96   ALT 24   AST 36   BILITOT 0.9     Coagulation: No results for input(s): PT, INR, APTT in the last 48 hours.    Significant Imaging:  Imaging results within the past 24 hours have been reviewed.

## 2022-11-04 NOTE — PLAN OF CARE
EVAL AND TX COMPLETED: facilitated bed mobility with CGA, transfers with CGA. Ambulated 10ft x 2 min A with RW. Recommend HHPT

## 2022-11-04 NOTE — CONSULTS
"[facility]  Cardiology  Consult Note    Patient Name: Syeda Dunlap  MRN: [id number]  Admission Date: 11/4/2022  Hospital Length of Stay: 0 days  Code Status: Full Code   Attending Provider: Ray Hyman MD   Consulting Provider: Ludy Yanez PA-C  Primary Care Physician: Montse Arciniega NP  Principal Problem:GI bleed    Patient information was obtained from patient, patient's relatives,  past medical records and ER records.     Inpatient consult to Cardiology  Consult performed by: Ludy Yanez PA-C  Consult ordered by: Megahn Love NP        Subjective:     Chief Complaint:  Weakness    HPI:   HPI obtained from chart and with assistance of family member at bedside    Ms. Dunlap is a 89 year old female patient whose current medical conditions include dementia, prior CVA with no residual deficits, AS, HTN, and hyperlipidemia who initially presented to [facility] ED with generalized weakness, increased fatigue, and frequent stool. There was no report of any associated fever, chills, james chest pain, SOB, palpitations, near syncope, or syncope. Workup there revealed anemia, +FOBT, and questionable new onset afib noted on EKG. Patient was subsequently transferred to [facility] for GI evaluation. Cardiology consulted to assist with management. Patient seen and examined today, resting in bed. Feels ok, states she has to "use the bathroom". Denies any real CV symptoms-no chest pain. No prior history of CAD or MI. Family reported prior diagnosis of anemia in 6/22 and patient was advised to hold ASA and conservative mgmt was recommended at that time.    Chart reviewed. H/H improved to 9.3/28.7 post transfusion. Echo pending. EKG's reviewed, poor quality with underlying noise, appear to show SR with PAC's. Will try to obtain repeat/better quality.         Past Medical History:   Diagnosis Date    Anemia, unspecified     Aortic stenosis     CVA (cerebral vascular accident)     " Essential (primary) hypertension     Mixed hyperlipidemia        Past Surgical History:   Procedure Laterality Date    APPENDECTOMY      CATARACT EXTRACTION      CHOLECYSTECTOMY      HYSTERECTOMY         Review of patient's allergies indicates:   Allergen Reactions    Sulfa (sulfonamide antibiotics)      Questionable in record       Current Facility-Administered Medications on File Prior to Encounter   Medication    [COMPLETED] 0.9%  NaCl infusion    [COMPLETED] iopamidoL (ISOVUE-370) injection 100 mL    [COMPLETED] pantoprazole injection 40 mg    [COMPLETED] potassium bicarbonate disintegrating tablet 25 mEq    [DISCONTINUED] 0.9%  NaCl infusion (for blood administration)    [DISCONTINUED] enoxaparin injection 50 mg     Current Outpatient Medications on File Prior to Encounter   Medication Sig    amLODIPine (NORVASC) 2.5 MG tablet Take 2.5 mg by mouth once daily.    atorvastatin (LIPITOR) 40 MG tablet Take 1 tablet by mouth once daily.    cranberry fruit concentrate 250 mg Chew Take by mouth.    multivit/folic acid/vit K1 (ONE-A-DAY WOMEN'S 50 PLUS ORAL) Take 1 tablet by mouth once daily.    olmesartan (BENICAR) 40 MG tablet Take 40 mg by mouth once daily.    [DISCONTINUED] amLODIPine (NORVASC) 5 MG tablet Take 5 mg by mouth once daily.    [DISCONTINUED] aspirin (ECOTRIN) 81 MG EC tablet Take 81 mg by mouth.    [DISCONTINUED] atorvastatin (LIPITOR) 40 MG tablet Take 40 mg by mouth once daily.     Family History       Problem Relation (Age of Onset)    Alzheimer's disease Mother    Heart attack Father          Tobacco Use    Smoking status: Never    Smokeless tobacco: Not on file   Substance and Sexual Activity    Alcohol use: Never    Drug use: Not on file    Sexual activity: Not on file     Review of Systems   Unable to perform ROS: Dementia   Constitutional: Positive for malaise/fatigue.   Hematologic/Lymphatic: Bruises/bleeds easily.   Gastrointestinal:         Frequent stools    Psychiatric/Behavioral:  Positive for memory loss.    Allergic/Immunologic: Negative.    Objective:     Vital Signs (Most Recent):  Temp: 97.6 °F (36.4 °C) (11/04/22 0810)  Pulse: 77 (11/04/22 0810)  Resp: 18 (11/04/22 0810)  BP: 125/66 (11/04/22 0810)  SpO2: 98 % (11/04/22 0810) Vital Signs (24h Range):  Temp:  [97.5 °F (36.4 °C)-98.2 °F (36.8 °C)] 97.6 °F (36.4 °C)  Pulse:  [] 77  Resp:  [12-21] 18  SpO2:  [89 %-100 %] 98 %  BP: (101-157)/(49-94) 125/66     Weight: 51.5 kg (113 lb 8.6 oz)  Body mass index is 18.33 kg/m².    SpO2: 98 %  O2 Device (Oxygen Therapy): room air    No intake or output data in the 24 hours ending 11/04/22 0856    Lines/Drains/Airways       Peripheral Intravenous Line  Duration                  Peripheral IV - Single Lumen 11/04/22 0000 20 G Anterior;Left;Proximal Forearm <1 day                    Physical Exam  Vitals and nursing note reviewed.   Constitutional:       General: She is not in acute distress.     Appearance: She is well-developed. She is not diaphoretic.      Comments: Elderly, frail appearing   HENT:      Head: Normocephalic and atraumatic.   Eyes:      General:         Right eye: No discharge.         Left eye: No discharge.      Pupils: Pupils are equal, round, and reactive to light.   Neck:      Thyroid: No thyromegaly.      Vascular: No JVD.      Trachea: No tracheal deviation.   Cardiovascular:      Rate and Rhythm: Normal rate and regular rhythm. Extrasystoles are present.     Heart sounds: S1 normal and S2 normal. Murmur heard.   Harsh midsystolic murmur is present at the upper right sternal border radiating to the neck.   Pulmonary:      Effort: Pulmonary effort is normal. No respiratory distress.      Breath sounds: Normal breath sounds. No wheezing or rales.   Abdominal:      General: There is no distension.      Palpations: Abdomen is soft.      Tenderness: There is no rebound.   Musculoskeletal:      Cervical back: Neck supple.      Right lower leg: No  edema.      Left lower leg: No edema.   Skin:     General: Skin is warm and dry.      Coloration: Skin is pale.      Findings: No erythema.   Neurological:      General: No focal deficit present.      Mental Status: She is alert and oriented to person, place, and time.   Psychiatric:         Mood and Affect: Mood normal.         Behavior: Behavior normal.         Thought Content: Thought content normal.       Significant Labs: CMP   Recent Labs   Lab 11/03/22 1732 11/04/22  0547    138   K 2.6* 2.8*   CL  --  106   CO2 23 24   GLU  --  72   BUN 16.3 14   CREATININE 1.01 0.8   CALCIUM 8.3* 8.6*   PROT  --  5.8*   ALBUMIN 1.6* 1.9*   BILITOT 0.6 0.9   ALKPHOS 95 96   AST 37* 36   ALT 23 24   ANIONGAP  --  8   , CBC   Recent Labs   Lab 11/03/22 1732 11/04/22  0547   WBC 6.8 8.62   HGB 7.6* 9.3*   HCT 23.7* 28.7*    244   , INR No results for input(s): INR, PROTIME in the last 48 hours., Troponin   Recent Labs   Lab 11/04/22  0547   TROPONINI 0.288*   , and All pertinent lab results from the last 24 hours have been reviewed.    Significant Imaging: Echocardiogram: Transthoracic echo (TTE) complete (Cupid Only): No results found for this or any previous visit., EKG: Reviewed, and X-Ray: CXR: X-Ray Chest 1 View (CXR): No results found for this visit on 11/04/22. and X-Ray Chest PA and Lateral (CXR): No results found for this visit on 11/04/22.    Assessment and Plan:   Patient who presents with GIB/anemia. Reportedly has underlying AS, echo pending, +murmur on exam. EKG's reviewed, appear to show SR with PAC's, poor quality. Regardless, patient is not a candidate for any AC or antiplatelet therapy at present time.     * GI bleed  -+FOBT   -GI on board    Aortic stenosis  -Echo pending    History of stroke  -Unable to give any antiplatelet or AC at present time given GI bleed/anemia    New onset a-fib  -EKG's reviewed, lots of underlying artifact/noise, appears to show SR with PAC's  -Repeat  pending  -Regardless, patient is not a candidate for AC at present time given GIB/anemia issues  -Echo pending    Iron deficiency anemia due to chronic blood loss  -H/H improved post transfusion  -+FOBT   -GI on board          VTE Risk Mitigation (From admission, onward)         Ordered     IP VTE HIGH RISK PATIENT  Once         11/04/22 0542     Place sequential compression device  Until discontinued         11/04/22 0542     Reason for No Pharmacological VTE Prophylaxis  Once        Question:  Reasons:  Answer:  Active Bleeding    11/04/22 0542                Thank you for your consult. I will follow-up with patient. Please contact us if you have any additional questions.    Ludy Yanez PA-C  Cardiology   [facility]

## 2022-11-04 NOTE — ASSESSMENT & PLAN NOTE
-EKG's reviewed, lots of underlying artifact/noise, appears to show SR with PAC's  -Repeat pending  -Regardless, patient is not a candidate for AC at present time given GIB/anemia issues  -Echo pending

## 2022-11-04 NOTE — SUBJECTIVE & OBJECTIVE
Interval History:   GI consulted and suggested lower endoscopy this admit once cleared by Cardiology.      Review of Systems   Unable to perform ROS: Other   Constitutional:  Positive for activity change and fatigue. Negative for appetite change and fever.   HENT:  Negative for sore throat.    Eyes:  Negative for visual disturbance.   Respiratory:  Negative for cough, chest tightness and shortness of breath.    Cardiovascular:  Negative for chest pain, palpitations and leg swelling.   Gastrointestinal:  Positive for blood in stool. Negative for abdominal distention, abdominal pain, constipation, diarrhea, nausea and vomiting.   Endocrine: Negative for polyuria.   Genitourinary:  Positive for frequency. Negative for decreased urine volume, dysuria, flank pain and hematuria.   Musculoskeletal:  Negative for back pain and gait problem.   Skin:  Negative for rash.   Neurological:  Negative for syncope, speech difficulty, weakness, light-headedness and headaches.        Dementia    Psychiatric/Behavioral:  Negative for confusion, hallucinations and sleep disturbance.      Son at bedside provide ROS.       Objective:     Vital Signs (Most Recent):  Temp: 97.6 °F (36.4 °C) (11/04/22 1047)  Pulse: 75 (11/04/22 1047)  Resp: 18 (11/04/22 1047)  BP: 138/65 (11/04/22 1047)  SpO2: 100 % (11/04/22 1047) Vital Signs (24h Range):  Temp:  [97.5 °F (36.4 °C)-98.2 °F (36.8 °C)] 97.6 °F (36.4 °C)  Pulse:  [] 75  Resp:  [12-21] 18  SpO2:  [89 %-100 %] 100 %  BP: (101-157)/(49-94) 138/65     Weight: 51.3 kg (113 lb)  Body mass index is 18.24 kg/m².  No intake or output data in the 24 hours ending 11/04/22 1315   Physical Exam  Vitals and nursing note reviewed.   Constitutional:       General: She is not in acute distress.     Appearance: She is well-developed. She is not diaphoretic.   HENT:      Head: Normocephalic and atraumatic.      Nose: Nose normal.   Eyes:      General: No scleral icterus.     Conjunctiva/sclera:  Conjunctivae normal.   Neck:      Trachea: No tracheal deviation.   Cardiovascular:      Rate and Rhythm: Normal rate and regular rhythm.      Heart sounds: Murmur heard.     No friction rub. No gallop.   Pulmonary:      Effort: Pulmonary effort is normal. No respiratory distress.      Breath sounds: Normal breath sounds. No stridor. No wheezing or rales.   Chest:      Chest wall: No tenderness.   Abdominal:      General: Bowel sounds are normal. There is no distension.      Palpations: Abdomen is soft. There is no mass.      Tenderness: There is no abdominal tenderness. There is no guarding or rebound.   Musculoskeletal:         General: No tenderness or deformity. Normal range of motion.      Cervical back: Normal range of motion and neck supple.      Right lower leg: No edema.      Left lower leg: No edema.   Skin:     General: Skin is warm and dry.      Coloration: Skin is pale.      Findings: No erythema or rash.   Neurological:      General: No focal deficit present.      Mental Status: She is alert. Mental status is at baseline.      Cranial Nerves: No cranial nerve deficit.      Motor: No abnormal muscle tone.      Coordination: Coordination normal.      Comments: Oriented to self, person and place   Psychiatric:         Behavior: Behavior normal.         Thought Content: Thought content normal.         Cognition and Memory: Cognition is impaired.       Significant Labs: All pertinent labs within the past 24 hours have been reviewed.  BMP:   Recent Labs   Lab 11/04/22  0547   GLU 72      K 2.8*      CO2 24   BUN 14   CREATININE 0.8   CALCIUM 8.6*   MG 1.8     CBC:   Recent Labs   Lab 11/03/22  1732 11/04/22  0547   WBC 6.8 8.62   HGB 7.6* 9.3*   HCT 23.7* 28.7*    244     CMP:   Recent Labs   Lab 11/03/22  1732 11/04/22  0547    138   K 2.6* 2.8*   CL  --  106   CO2 23 24   GLU  --  72   BUN 16.3 14   CREATININE 1.01 0.8   CALCIUM 8.3* 8.6*   PROT  --  5.8*   ALBUMIN 1.6* 1.9*    BILITOT 0.6 0.9   ALKPHOS 95 96   AST 37* 36   ALT 23 24   ANIONGAP  --  8       Significant Imaging:

## 2022-11-04 NOTE — ED NOTES
Patient completed tele-consult with Dr. Whitaker at [facility] recommended to transfer for further evaluation. Dr. Mayfield present when recommendations were made.

## 2022-11-04 NOTE — ED NOTES
1 Unit of RBC verified with another RN and started will continue to monitor patient for transfusion reactions.

## 2022-11-04 NOTE — HPI
"HPI obtained from chart and with assistance of family member at bedside    Ms. Dunlap is a 89 year old female patient whose current medical conditions include dementia, prior CVA with no residual deficits, AS, HTN, and hyperlipidemia who initially presented to [facility] ED with generalized weakness, increased fatigue, and frequent stool. There was no report of any associated fever, chills, james chest pain, SOB, palpitations, near syncope, or syncope. Workup there revealed anemia, +FOBT, and questionable new onset afib noted on EKG. Patient was subsequently transferred to [facility] for GI evaluation. Cardiology consulted to assist with management. Patient seen and examined today, resting in bed. Feels ok, states she has to "use the bathroom". Denies any real CV symptoms-no chest pain. No prior history of CAD or MI. Family reported prior diagnosis of anemia in 6/22 and patient was advised to hold ASA and conservative mgmt was recommended at that time.    Chart reviewed. H/H improved to 9.3/28.7 post transfusion. Echo pending. EKG's reviewed, poor quality with underlying noise, appear to show SR with PAC's. Will try to obtain repeat/better quality.     "

## 2022-11-04 NOTE — PLAN OF CARE
[facility]  Initial Discharge Assessment       Primary Care Provider: Montse Arciniega NP    Admission Diagnosis: GI bleed [K92.2]    Admission Date: 11/4/2022  Expected Discharge Date:     Discharge Barriers Identified: None    Payor: MEDICARE / Plan: MEDICARE PART A & B / Product Type: Government /     Extended Emergency Contact Information  Primary Emergency Contact: layla morataya  Mobile Phone: [phone]  Relation: Significant other  Preferred language: English   needed? No    Discharge Plan A: Rehab  Discharge Plan B: Skilled Nursing Facility      [address] [address]  Phone: [phone] Fax: [phone]      Initial Assessment (most recent)       Adult Discharge Assessment - 11/04/22 1603          Discharge Assessment    Assessment Type Discharge Planning Assessment     Confirmed/corrected address, phone number and insurance Yes     Confirmed Demographics Correct on Facesheet     Source of Information family     Communicated CODY with patient/caregiver Date not available/Unable to determine     Lives With child(griselda), adult     Do you expect to return to your current living situation? Yes     Do you have help at home or someone to help you manage your care at home? Yes     Prior to hospitilization cognitive status: Unable to Assess     Current cognitive status: Unable to Assess     Walking or Climbing Stairs Difficulty ambulation difficulty, requires equipment;stair climbing difficulty, assistance 1 person     Dressing/Bathing Difficulty none     Home Accessibility wheelchair accessible     Home Layout Able to live on 1st floor     Equipment Currently Used at Home walker, rolling;grab bar;shower chair;cane, straight     Readmission within 30 days? No     Patient currently being followed by outpatient case management? No     Do you currently have service(s) that help you manage your care at home? Yes     Name and Contact  number of agency [facility]     Is the pt/caregiver preference to resume services with current agency Yes     Do you take prescription medications? Yes     Do you have prescription coverage? Yes     Do you have any problems affording any of your prescribed medications? No     Is the patient taking medications as prescribed? yes     Who is going to help you get home at discharge? agency     How do you get to doctors appointments? family or friend will provide     Are you on dialysis? No     Do you take coumadin? No     Discharge Plan A Rehab     Discharge Plan B Skilled Nursing Facility     DME Needed Upon Discharge  none     Discharge Plan discussed with: Adult children     Discharge Barriers Identified None                   Met with pt's son at bedside for DC assessment. Pt lives at home with a different son and uses a RW and cane. PT/OT rec SNF but pt is also a candidate for rehab due to having traditional medicare. SWer provided a transitional care folder, information on advanced directives, information on pharmacy bedside delivery, and discharge planning begins on admission with contact information for any needs/questions.    Alen Mcleod LMSW 11/4/2022 4:22 PM

## 2022-11-04 NOTE — ASSESSMENT & PLAN NOTE
-In the setting of anemia and severe aortic stenosis   -Trend troponin   -ASA not offered given GI bleed  Continue statin

## 2022-11-04 NOTE — PT/OT/SLP EVAL
Occupational Therapy   Evaluation    Name: Syeda Dunlap  MRN: [id number]  Admitting Diagnosis:  GI bleed  Recent Surgery: * No surgery found *      Recommendations:     Discharge Recommendations: nursing facility, skilled  Discharge Equipment Recommendations:   (TBD)  Barriers to discharge:       Assessment:     Syeda Dunlap is a 89 y.o. female with a medical diagnosis of GI bleed.  She presents with DEBILITY AND GENERALIZED WEAKNESS. Performance deficits affecting function: weakness, gait instability, decreased upper extremity function, impaired balance, impaired endurance, impaired self care skills, impaired functional mobility, decreased ROM, decreased safety awareness.      Rehab Prognosis: Fair; patient would benefit from acute skilled OT services to address these deficits and reach maximum level of function.       Plan:     Patient to be seen 2 x/week to address the above listed problems via self-care/home management, therapeutic activities, therapeutic exercises  Plan of Care Expires:    Plan of Care Reviewed with: patient    Subjective     Chief Complaint: DEBILITY AND GENERALIZED WEAKNESS  Patient/Family Comments/goals:     Occupational Profile:  Living Environment: LIVES WITH SON AND FAMILY LIVES NEAR BY  Previous level of function: MOD (I) WITH ADL'S AND FUNCTIONAL MOBILITY  Roles and Routines: DEBILITY AND GENERALIZED WEAKNESS  Equipment Used at Home:  walker, rolling, grab bar, shower chair, cane, straight  Assistance upon Discharge:     Pain/Comfort:  Pain Rating 1: 0/10    Patients cultural, spiritual, Jewish conflicts given the current situation:      Objective:     Communicated with: NURSE AND Epic CHART REVIEW prior to session.  Patient found HOB elevated with telemetry, peripheral IV upon OT entry to room.    General Precautions: Standard,     Orthopedic Precautions:    Braces: N/A  Respiratory Status: Room air    Occupational Performance:    Bed Mobility:    Patient completed  Rolling/Turning to Left with  minimum assistance  Patient completed Rolling/Turning to Right with minimum assistance  Patient completed Scooting/Bridging with minimum assistance  Patient completed Supine to Sit with minimum assistance  Patient completed Sit to Supine with minimum assistance    Functional Mobility/Transfers:  Patient completed Sit <> Stand Transfer with minimum assistance  with  hand-held assist   Patient completed Toilet Transfer Step Transfer technique with minimum assistance with  grab bars  Functional Mobility: PT AMBULATED WITH MIN A/ HAND HELD ASSIST    Activities of Daily Living:  Grooming: minimum assistance .  Lower Body Dressing: minimum assistance .  Toileting: minimum assistance .    Cognitive/Visual Perceptual:  Cognitive/Psychosocial Skills:     -       Oriented to: Person   -       Follows Commands/attention:Follows one-step commands  -       Communication: clear/fluent  -       Memory: Impaired STM, Impaired LTM, and Poor immediate recall  -       Safety awareness/insight to disability: impaired     Physical Exam:  Upper Extremity Range of Motion:     -       Right Upper Extremity: WFL  -       Left Upper Extremity: WFL  Upper Extremity Strength:    -       Right Upper Extremity: MMT: :3/5 GROSSLY  -       Left Upper Extremity: MMT: 3/5 GROSSLY   Strength:    -       Right Upper Extremity: MMT: 3/5 GROSSLY  -       Left Upper Extremity: MMT: 3/5 GROSSLY    AMPAC 6 Click ADL:  AMPAC Total Score: 17    Treatment & Education:  Patient educated on role of OT in acute setting and benefits of participation. Educated on techniques to use to increase independence and decrease fall risk with functional transfers. PT REQ MIN A WITH TOILET ING AND TOILET TRANSFER WITH  VC'S FOR SAFETY AND INSTRUCTION. Encouraged completion of B UE AROM therex throughout the day to tolerance to increase functional strength and activity tolerance. Patient stated understanding and in agreement with  POC.      Patient left HOB elevated with all lines intact, call button in reach, bed alarm on, NURSE YUE notified, and SON present    GOALS:   Multidisciplinary Problems       Occupational Therapy Goals          Problem: Occupational Therapy    Goal Priority Disciplines Outcome Interventions   Occupational Therapy Goal     OT, PT/OT     Description: O.T. GOALS TO BE MET BY 11-18-22  PT WILL TOLERATE 1 SET X 15 REPS B UE ROM EXERCISE   PT WILL REQ MOD (I) WITH AE WITH TOILET T/F'S  MOD (I) WITH TOILET ING  MOD (I) WITH LE DRESSING                       History:     Past Medical History:   Diagnosis Date    Anemia, unspecified     Aortic stenosis     CVA (cerebral vascular accident)     Essential (primary) hypertension     Mixed hyperlipidemia          Past Surgical History:   Procedure Laterality Date    APPENDECTOMY      CATARACT EXTRACTION      CHOLECYSTECTOMY      HYSTERECTOMY         Time Tracking:     OT Date of Treatment: 11/04/22  OT Start Time: 1525  OT Stop Time: 1550  OT Total Time (min): 25 min    Billable Minutes:Evaluation 10 MINUTES  Therapeutic Activity 15 MINUTES    11/4/2022

## 2022-11-04 NOTE — H&P
[facility] Medicine  History & Physical    Patient Name: Syeda Dunlap  MRN: [id number]  Patient Class: IP- Inpatient  Admission Date: 11/4/2022  Attending Physician:    Primary Care Provider: Montse Arciniega NP         Patient information was obtained from relative(s) and ER records.     Subjective:     Principal Problem:GI bleed    Chief Complaint: Generalized weakness      HPI: The patient is a 89 y.o. female with Dementia, CVA- no deficits, Aortic stenosis, HTN, and HLD who presented to [facility] ED with generalized weakness, fatigue, pale skin, and frequent stools. Family unsure if stools are black or bloody.   She was found to have new onset Atrial fibrillation -rate 90. Hemoglobin below baseline at 7.6. Stool positive for occult blood, BP stable. Started on IV protonix and will get 1 unit PRBC transfusion. Potassium has been replaced. Transferred to [facility] for GI evaluation.     Of note, the family reports the pt was found to have anemia 6/2022. She was transfused and followed up with GI who recommended to hold ASA and treat conservatively. Pt was also recently noted to have low blood pressure. PCP recommended to hold home meds Amlodipine and Losartan.     The patient has dementia. Her son, Kervin Dunlap, has POA.     The patient will be placed in observation under hospital medicine           Past Medical History:   Diagnosis Date    Anemia, unspecified     Aortic stenosis     CVA (cerebral vascular accident)     Essential (primary) hypertension     Mixed hyperlipidemia        Past Surgical History:   Procedure Laterality Date    APPENDECTOMY      CATARACT EXTRACTION      CHOLECYSTECTOMY      HYSTERECTOMY         Review of patient's allergies indicates:   Allergen Reactions    Sulfa (sulfonamide antibiotics)      Questionable in record       Current Facility-Administered Medications on File Prior to Encounter   Medication    [COMPLETED] 0.9%  NaCl  infusion    [COMPLETED] iopamidoL (ISOVUE-370) injection 100 mL    [COMPLETED] pantoprazole injection 40 mg    [COMPLETED] potassium bicarbonate disintegrating tablet 25 mEq    [DISCONTINUED] 0.9%  NaCl infusion (for blood administration)    [DISCONTINUED] enoxaparin injection 50 mg     Current Outpatient Medications on File Prior to Encounter   Medication Sig    amLODIPine (NORVASC) 2.5 MG tablet Take 2.5 mg by mouth once daily.    atorvastatin (LIPITOR) 40 MG tablet Take 1 tablet by mouth once daily.    cranberry fruit concentrate 250 mg Chew Take by mouth.    multivit/folic acid/vit K1 (ONE-A-DAY WOMEN'S 50 PLUS ORAL) Take 1 tablet by mouth once daily.    olmesartan (BENICAR) 40 MG tablet Take 40 mg by mouth once daily.    [DISCONTINUED] amLODIPine (NORVASC) 5 MG tablet Take 5 mg by mouth once daily.    [DISCONTINUED] aspirin (ECOTRIN) 81 MG EC tablet Take 81 mg by mouth.    [DISCONTINUED] atorvastatin (LIPITOR) 40 MG tablet Take 40 mg by mouth once daily.     Family History       Problem Relation (Age of Onset)    Alzheimer's disease Mother    Heart attack Father          Tobacco Use    Smoking status: Never    Smokeless tobacco: Not on file   Substance and Sexual Activity    Alcohol use: Never    Drug use: Not on file    Sexual activity: Not on file     Review of Systems   Unable to perform ROS: Dementia   Constitutional:  Positive for fatigue.   Gastrointestinal:  Positive for diarrhea.   Skin:  Positive for pallor.   Neurological:  Positive for weakness.   Objective:     Vital Signs (Most Recent):  Temp: 97.8 °F (36.6 °C) (11/04/22 0524)  Pulse: 72 (11/04/22 0544)  Resp: 18 (11/04/22 0524)  BP: 137/64 (11/04/22 0524)  SpO2: 100 % (11/04/22 0524) Vital Signs (24h Range):  Temp:  [97.5 °F (36.4 °C)-98.2 °F (36.8 °C)] 97.8 °F (36.6 °C)  Pulse:  [] 72  Resp:  [12-21] 18  SpO2:  [89 %-100 %] 100 %  BP: (101-157)/(49-94) 137/64     Weight: 51.5 kg (113 lb 8.6 oz)  Body mass index is 18.33  kg/m².    Physical Exam  Vitals and nursing note reviewed.   Constitutional:       General: She is not in acute distress.     Appearance: She is well-developed. She is not diaphoretic.   HENT:      Head: Normocephalic and atraumatic.      Nose: Nose normal.   Eyes:      General: No scleral icterus.     Conjunctiva/sclera: Conjunctivae normal.   Neck:      Trachea: No tracheal deviation.   Cardiovascular:      Rate and Rhythm: Normal rate and regular rhythm.      Heart sounds: Murmur heard.     No friction rub. No gallop.   Pulmonary:      Effort: Pulmonary effort is normal. No respiratory distress.      Breath sounds: Normal breath sounds. No stridor. No wheezing or rales.   Chest:      Chest wall: No tenderness.   Abdominal:      General: Bowel sounds are normal. There is no distension.      Palpations: Abdomen is soft. There is no mass.      Tenderness: There is no abdominal tenderness. There is no guarding or rebound.   Musculoskeletal:         General: No tenderness or deformity. Normal range of motion.      Cervical back: Normal range of motion and neck supple.   Skin:     General: Skin is warm and dry.      Coloration: Skin is pale.      Findings: No erythema or rash.   Neurological:      Mental Status: She is alert. Mental status is at baseline.      Cranial Nerves: No cranial nerve deficit.      Motor: No abnormal muscle tone.      Coordination: Coordination normal.      Comments: Oriented to person and place   Psychiatric:         Behavior: Behavior normal.         Thought Content: Thought content normal.         Cognition and Memory: Cognition is impaired.           Significant Labs: All pertinent labs within the past 24 hours have been reviewed.    Significant Imaging: I have reviewed all pertinent imaging results/findings within the past 24 hours.    Assessment/Plan:     * GI bleed  Serial H/H   GI consult   protonix       Acute blood loss anemia  Pt received one unit PRBC today   Monitor counts        New onset a-fib  Patient with Paroxysmal (<7 days) atrial fibrillation which is controlled currently with none. Patient is currently in atrial fibrillation.[id number] Score: 4. HASBLED Score: elevated.     Repeat EKG/Troponin   Consult cardiology   Echo       History of stroke  ASA on hold due to presumed GI bleed since 6/2022  Cont Statin         VTE Risk Mitigation (From admission, onward)         Ordered     IP VTE HIGH RISK PATIENT  Once         11/04/22 0542     Place sequential compression device  Until discontinued         11/04/22 0542     Reason for No Pharmacological VTE Prophylaxis  Once        Question:  Reasons:  Answer:  Active Bleeding    11/04/22 0542                   Meghan Love NP  Department of Hospital Medicine   [facility]

## 2022-11-04 NOTE — ASSESSMENT & PLAN NOTE
Patient with Paroxysmal (<7 days) atrial fibrillation which is controlled currently with none. Patient is currently in atrial fibrillation.[id number] Score: 4. HASBLED Score: elevated.     Repeat EKG/Troponin   Consult cardiology   Echo

## 2022-11-04 NOTE — PROVIDER TRANSFER
(Physician in Lead of Transfers)   Outside Transfer Acceptance Note / Regional Referral Center    Referring facility: [facility]   Referring provider: MINESH PARKER  Accepting facility: [facility]  Reason for transfer:  Higher level of care  Transfer diagnosis: GI bleed  Transfer specialty requested: Gastroenterology  Transfer specialty notified: Yes  Transfer level: 2  Isolation status: No active isolations   Admission class or status: IP- Inpatient      Narrative     Patient is a 89 y.o. female who has no past medical history on file presented with GI bleed. Stool positive for occult blood. See below labs. Hemoglobin below baseline. BP stable. Started on IV protonix and will get 1 unit PRBC transfusion. Potassium has been replaced. Transferring for GI evaluation.     Objective     Vitals: Temp: 97.8 °F (36.6 °C) (11/03/22 2330)  Pulse: 93 (11/04/22 0011)  Resp: 16 (11/04/22 0011)  BP: 119/77 (11/04/22 0002)  SpO2: 96 % (11/04/22 0011)    Recent Labs: see EPIC  CBC:  Recent Labs   Lab 11/03/22  1732   WBC 6.8   HGB 7.6*   HCT 23.7*        CMP:  Recent Labs   Lab 11/03/22  1732   GLUCOSE 118   CALCIUM 8.3*   ALBUMIN 1.6*      K 2.6*   CO2 23   BUN 16.3   CREATININE 1.01   ALKPHOS 95   ALT 23   AST 37*   BILITOT 0.6     No results for input(s): LACTATE in the last 72 hours.  No results for input(s): CPK, CPKMB, TROPONINI, MB in the last 168 hours.  BNP  No results for input(s): BNP, BNPTRIAGEBLO in the last 168 hours.  ABG  No results for input(s): PH, PO2, PCO2, HCO3, BE in the last 168 hours.   Lab Results   Component Value Date    INR 1.14 09/13/2017    INR 1.04 09/10/2017    PROTIME 14.4 (H) 09/13/2017    PROTIME 13.4 09/10/2017       Recent imaging:   CT Abdomen Pelvis With Contrast  Narrative: EXAMINATION:  CT ABDOMEN AND PELVIS    CLINICAL HISTORY:  Weakness and diarrhea    TECHNIQUE:  Axial CT images were obtained through the abdomen and  pelvis following IV administration of 100 cc contrast.  Coronal and sagittal reconstructions submitted and interpreted.  Total .  Automated exposure control utilized.    COMPARISON:  None.    FINDINGS:  Small bilateral pleural effusions are present.    Gallbladder is absent.  Simple renal cysts require no follow-up.  Wedge-shaped hypodensity in the posterior spleen may relate to an infarct.  Pancreas, liver and adrenals are normal.    There is a small hiatal hernia.  No dilated bowel loops.  Moderate fecal material is in the rectum with mild wall thickening and surrounding inflammation.  No free fluid or pneumoperitoneum.    No enlarged lymph nodes.  Abdominal aorta is normal in caliber.    Urinary bladder is normal.  Uterus is absent.    No acute osseous findings.  Impression: 1. Findings suggestive of proctocolitis.  2. Small bilateral pleural effusions.  3. Small splenic infarct.    Electronically signed by: Cam Duong MD  Date:    11/03/2022  Time:    20:45  X-Ray Chest 1 View  Narrative: EXAMINATION:  XR CHEST 1 VIEW    CLINICAL HISTORY:  fatigue;    COMPARISON:  12/21/2018    FINDINGS:  Single view of the chest shows pulmonary hyperinflation without focal consolidation, pneumothorax or pleural effusion.  Heart is upper normal in size.  Aorta is partially calcified.  Impression: No acute findings in the chest    Electronically signed by: Cam Duong MD  Date:    11/03/2022  Time:    19:08      Airway:     Vent settings:     IV access:        Peripheral IV - Single Lumen 11/03/22 1555 20 G Anterior;Distal;Left Forearm (Active)   Site Assessment Clean;Dry;Intact 11/03/22 1610   Extremity Assessment Distal to IV No swelling;No warmth;No redness 11/03/22 1610   Dressing Status Clean;Dry;Intact 11/03/22 1610   Dressing Intervention Integrity maintained 11/03/22 1610            Peripheral IV - Single Lumen 11/03/22 1906 20 G Anterior;Right Forearm (Active)   Site Assessment Clean;Dry;Intact 11/03/22 1906    Line Status Saline locked 11/03/22 1906   Dressing Status Clean;Dry;Intact 11/03/22 1906   Dressing Intervention First dressing 11/03/22 1906     Allergies:   Review of patient's allergies indicates:   Allergen Reactions    Atorvastatin      Cramps    Sulfa (sulfonamide antibiotics)      Questionable in record      NPO: No    Anticoagulation:   Anticoagulants       None             Instructions      [facility]  Admit to Hospital Medicine  Upon patient arrival to floor, please contact Hospital Medicine on call.

## 2022-11-04 NOTE — CONSULTS
[facility] Surg  Gastroenterology  Consult Note    Patient Name: Syeda Dunlap  MRN: [id number]  Admission Date: 11/4/2022  Hospital Length of Stay: 0 days  Code Status: Full Code   Attending Provider: Rya Hyman MD   Consulting Provider: Victor Hugo Corea PA-C  Primary Care Physician: Montse Arciniega NP  Principal Problem:GI bleed    Inpatient consult to Gastroenterology  Consult performed by: Victor Hugo Corea PA-C  Consult ordered by: Meghan Love NP  Reason for consult: GI bleed        Subjective:     HPI:  The patient presented to the ER for pain in her tailbone and general unwell feeling. She was found to have a sacral decubitus ulcer but WBC count normal and patient afebrile. The patient was also found to have a Hgb of 7.6. Her baseline seems to be around 10, but she was admitted in June 2022 for Hgb around 7 with rectal bleeding. Family opted monitoring over endoscopic evaluation at that time after discussing risks/benefits with a GI provider. This admit, she has been transfused one unit of blood. Hgb this morning is 9.3. BUN and creatinine are normal. She has been off ASA and NSAIDs since June. She reports a history of hemorrhoids. CT abd/pel w/ contrast showed moderate fecal material in the rectum with mild wall thickening and surrounding inflammation suggestive of proctocolitis. The patient has dementia. She denies any symptoms and reports a regular BM once daily. However, her son says she goes to the restroom several times a day, but he is unsure if stools are produced. The patient is also noted to have new Afib. Potassium 2.6 on admit. Troponin 0.288. Her son provides a history of valvular heart disease. Cardiology has been consulted.       Past Medical History:   Diagnosis Date    Anemia, unspecified     Aortic stenosis     CVA (cerebral vascular accident)     Essential (primary) hypertension     Mixed hyperlipidemia        Past Surgical History:   Procedure Laterality Date     APPENDECTOMY      CATARACT EXTRACTION      CHOLECYSTECTOMY      HYSTERECTOMY         Review of patient's allergies indicates:   Allergen Reactions    Sulfa (sulfonamide antibiotics)      Questionable in record     Family History       Problem Relation (Age of Onset)    Alzheimer's disease Mother    Heart attack Father          Tobacco Use    Smoking status: Never    Smokeless tobacco: Not on file   Substance and Sexual Activity    Alcohol use: Never    Drug use: Not on file    Sexual activity: Not on file     Review of Systems   Unable to perform ROS: Dementia (She denies sx but I question the accuracy given her dementia)   Objective:     Vital Signs (Most Recent):  Temp: 97.6 °F (36.4 °C) (11/04/22 0810)  Pulse: 77 (11/04/22 0810)  Resp: 18 (11/04/22 0810)  BP: 125/66 (11/04/22 0810)  SpO2: 98 % (11/04/22 0810) Vital Signs (24h Range):  Temp:  [97.5 °F (36.4 °C)-98.2 °F (36.8 °C)] 97.6 °F (36.4 °C)  Pulse:  [] 77  Resp:  [12-21] 18  SpO2:  [89 %-100 %] 98 %  BP: (101-157)/(49-94) 125/66     Weight: 51.5 kg (113 lb 8.6 oz) (11/04/22 0323)  Body mass index is 18.33 kg/m².    No intake or output data in the 24 hours ending 11/04/22 0915    Lines/Drains/Airways       Peripheral Intravenous Line  Duration                  Peripheral IV - Single Lumen 11/04/22 0000 20 G Anterior;Left;Proximal Forearm <1 day                    Physical Exam  Constitutional:       General: She is not in acute distress.     Appearance: Normal appearance. She is well-developed.   HENT:      Head: Normocephalic and atraumatic.   Eyes:      Extraocular Movements: Extraocular movements intact.   Cardiovascular:      Rate and Rhythm: Normal rate. Rhythm irregularly irregular.      Heart sounds: Murmur heard.   Pulmonary:      Effort: Pulmonary effort is normal. No respiratory distress.      Breath sounds: Normal breath sounds. No wheezing.   Abdominal:      General: Bowel sounds are normal. There is no distension.      Palpations:  Abdomen is soft. There is no mass.      Tenderness: There is no abdominal tenderness.   Musculoskeletal:      Cervical back: Normal range of motion and neck supple.      Right lower leg: No edema.      Left lower leg: No edema.   Skin:     General: Skin is warm and dry.      Findings: No rash.   Neurological:      Mental Status: She is alert.      Cranial Nerves: No cranial nerve deficit.      Comments: Oriented to person and place but not time.    Psychiatric:         Behavior: Behavior normal.       Significant Labs:  CBC:   Recent Labs   Lab 11/03/22  1732 11/04/22  0547   WBC 6.8 8.62   HGB 7.6* 9.3*   HCT 23.7* 28.7*    244     CMP:   Recent Labs   Lab 11/04/22  0547   GLU 72   CALCIUM 8.6*   ALBUMIN 1.9*   PROT 5.8*      K 2.8*   CO2 24      BUN 14   CREATININE 0.8   ALKPHOS 96   ALT 24   AST 36   BILITOT 0.9     Coagulation: No results for input(s): PT, INR, APTT in the last 48 hours.    Significant Imaging:  Imaging results within the past 24 hours have been reviewed.    Assessment/Plan:     Aortic stenosis  Cardiology consult pending.     History of stroke  ASA been on hold since June.     New onset a-fib  Rate controlled. Cardiology consult pending.     Iron deficiency anemia due to chronic blood loss  Patient with acute on chronic anemia. Good response to 1u PRBC.   Questionable rectal bleeding with CT showing possible proctocolitis and moderate fecal material.   May benefit from lower endoscopy this admit once Cardiology has evaluated and potassium corrected.   GI ok with starting clear liquid diet. Son denies any prior symptoms of dysphagia or dietary restrictions.   Monitor H/H and transfuse as indicated.         Thank you for your consult. I will follow-up with patient. Please contact us if you have any additional questions.    Victor Hugo Corea PA-C  Gastroenterology  [facility]

## 2022-11-04 NOTE — ASSESSMENT & PLAN NOTE
Patient with Paroxysmal (<7 days) atrial fibrillation which is controlled currently with none. Patient is currently in atrial fibrillation.[id number] Score: 4. HASBLED Score: elevated.     Repeat EKG/Troponin   Consult cardiology   Echo

## 2022-11-04 NOTE — HOSPITAL COURSE
Admitted for further evaluation of increased generalized weakness and anemia with Hgb 7.6 with baseline around 10. No clear h/o melena or bright red blood per rectum this time but stool occult blood is positive. S/P 1 units of P-RBC overnight with repeat Hgb 9.3 this morning. CT abd/pelvis suggestive of moderate fecal material in the rectum  and proctocolitis. GI consulted and suggested lower endoscopy this admit once cleared by Cardiology. Pt is noted to have new onset Atrial fibrillation with controlled rate. Echo done today confirmed severe aortic stenosis with valve area 0.79.     11/5- No overt bleeding reported. Stool reportedly loose and brown. Hgb is fairly stable. Iron study is suggestive of anemia of chronic disease with iron deficiency. IV iron initiated. GI aborted plan for endoscopy given pt deemed high risk per Cardiology  with h/o severe aortic stenosis. Bowel regimen initiated for fecal impaction and surrounding inflammation in the rectum.  K.3.1. Replacement in progress. Urine culture growing GNR. Rocephin day #2.     11/6- Multiple bowel movements reported overnight with Fleets enema and Lactulose yesterday. No overt GI bleeding reported and Hgb remains stable at 9.3 post transfusion x 1 unit. Pt remains in A.fib with controlled rate. Risk of stroke discussed with the family. Pt is at risk of overt GI bleed with AC treatment given risk of gastrointestinal AVM in the setting of severe AS. Family opted conservative treatment without AC and accepted the risk of stoke. At this point , pt deemed stable for discharge to home with home health and follow up with PCP and primary Cardiologist and Gastroenterologist.

## 2022-11-04 NOTE — ED NOTES
[facility] here to transport patient unable to sign daughter n law in the room stated she's unable to sign consent. Patient's son here at bedside earlier and aware of transfer. Patient Son and wife will meet her at [facility] family updated with accepting MD, patient's room number and nurse.

## 2022-11-04 NOTE — PT/OT/SLP PROGRESS
Occupational Therapy      Patient Name:  Syeda Dunlap   MRN:  [id number]  EVAL INITIATED VIA CHART REVIEW. PT IN PROCEDURE. WILL COMPLETE AT LATER TIME.

## 2022-11-04 NOTE — ASSESSMENT & PLAN NOTE
Serial H/H   GI consult   protonix   11/4-  S/P 1 unit of P-RBC overnight . Hgb 9.3 >7.6  GI consulted and suggested lower endoscopy this admit once cleared by Cardiology

## 2022-11-04 NOTE — PLAN OF CARE
Remains injury free. Denies c/o pain. No evidence of bleeding. Patient insists on going to bathroom instead of bedside commode. Patient needs standby assist. Son at bedside, no s/s acute distress. Will monitor.

## 2022-11-04 NOTE — ASSESSMENT & PLAN NOTE
Patient with acute on chronic anemia. Good response to 1u PRBC.   Questionable rectal bleeding with CT showing possible proctocolitis and moderate fecal material.   May benefit from lower endoscopy this admit once Cardiology has evaluated and potassium corrected.   GI ok with starting clear liquid diet. Son denies any prior symptoms of dysphagia or dietary restrictions.   Monitor H/H and transfuse as indicated.

## 2022-11-05 PROBLEM — K52.89 STERCORAL COLITIS: Status: ACTIVE | Noted: 2022-11-05

## 2022-11-05 LAB
ALBUMIN SERPL BCP-MCNC: 1.7 G/DL (ref 3.5–5.2)
ALP SERPL-CCNC: 92 U/L (ref 55–135)
ALT SERPL W/O P-5'-P-CCNC: 30 U/L (ref 10–44)
ANION GAP SERPL CALC-SCNC: 8 MMOL/L (ref 8–16)
AST SERPL-CCNC: 43 U/L (ref 10–40)
BASOPHILS # BLD AUTO: 0.04 K/UL (ref 0–0.2)
BASOPHILS NFR BLD: 0.5 % (ref 0–1.9)
BILIRUB SERPL-MCNC: 0.7 MG/DL (ref 0.1–1)
BUN SERPL-MCNC: 13 MG/DL (ref 10–30)
CALCIUM SERPL-MCNC: 8.1 MG/DL (ref 8.7–10.5)
CHLORIDE SERPL-SCNC: 109 MMOL/L (ref 95–110)
CO2 SERPL-SCNC: 24 MMOL/L (ref 23–29)
CREAT SERPL-MCNC: 0.8 MG/DL (ref 0.5–1.4)
DIFFERENTIAL METHOD: ABNORMAL
EOSINOPHIL # BLD AUTO: 0.1 K/UL (ref 0–0.5)
EOSINOPHIL NFR BLD: 1 % (ref 0–8)
ERYTHROCYTE [DISTWIDTH] IN BLOOD BY AUTOMATED COUNT: 16.3 % (ref 11.5–14.5)
EST. GFR  (NO RACE VARIABLE): >60 ML/MIN/1.73 M^2
GLUCOSE SERPL-MCNC: 60 MG/DL (ref 70–110)
HCT VFR BLD AUTO: 26.4 % (ref 37–48.5)
HGB BLD-MCNC: 8.3 G/DL (ref 12–16)
IMM GRANULOCYTES # BLD AUTO: 0.03 K/UL (ref 0–0.04)
IMM GRANULOCYTES NFR BLD AUTO: 0.4 % (ref 0–0.5)
LYMPHOCYTES # BLD AUTO: 0.9 K/UL (ref 1–4.8)
LYMPHOCYTES NFR BLD: 11.3 % (ref 18–48)
MAGNESIUM SERPL-MCNC: 1.7 MG/DL (ref 1.6–2.6)
MCH RBC QN AUTO: 27.7 PG (ref 27–31)
MCHC RBC AUTO-ENTMCNC: 31.4 G/DL (ref 32–36)
MCV RBC AUTO: 88 FL (ref 82–98)
MONOCYTES # BLD AUTO: 0.5 K/UL (ref 0.3–1)
MONOCYTES NFR BLD: 6.7 % (ref 4–15)
NEUTROPHILS # BLD AUTO: 6.3 K/UL (ref 1.8–7.7)
NEUTROPHILS NFR BLD: 80.1 % (ref 38–73)
NRBC BLD-RTO: 0 /100 WBC
PHOSPHATE SERPL-MCNC: 3 MG/DL (ref 2.7–4.5)
PLATELET # BLD AUTO: 200 K/UL (ref 150–450)
PMV BLD AUTO: 9.8 FL (ref 9.2–12.9)
POCT GLUCOSE: 116 MG/DL (ref 70–110)
POCT GLUCOSE: 69 MG/DL (ref 70–110)
POTASSIUM SERPL-SCNC: 3.1 MMOL/L (ref 3.5–5.1)
PROT SERPL-MCNC: 4.8 G/DL (ref 6–8.4)
RBC # BLD AUTO: 3 M/UL (ref 4–5.4)
SODIUM SERPL-SCNC: 141 MMOL/L (ref 136–145)
WBC # BLD AUTO: 7.81 K/UL (ref 3.9–12.7)

## 2022-11-05 PROCEDURE — 99233 PR SUBSEQUENT HOSPITAL CARE,LEVL III: ICD-10-PCS | Mod: ,,, | Performed by: INTERNAL MEDICINE

## 2022-11-05 PROCEDURE — 99233 SBSQ HOSP IP/OBS HIGH 50: CPT | Mod: ,,, | Performed by: INTERNAL MEDICINE

## 2022-11-05 PROCEDURE — 83735 ASSAY OF MAGNESIUM: CPT | Performed by: NURSE PRACTITIONER

## 2022-11-05 PROCEDURE — 63600175 PHARM REV CODE 636 W HCPCS: Performed by: INTERNAL MEDICINE

## 2022-11-05 PROCEDURE — 21400001 HC TELEMETRY ROOM

## 2022-11-05 PROCEDURE — 25000003 PHARM REV CODE 250: Performed by: INTERNAL MEDICINE

## 2022-11-05 PROCEDURE — 85025 COMPLETE CBC W/AUTO DIFF WBC: CPT | Performed by: NURSE PRACTITIONER

## 2022-11-05 PROCEDURE — 36415 COLL VENOUS BLD VENIPUNCTURE: CPT | Performed by: NURSE PRACTITIONER

## 2022-11-05 PROCEDURE — 84100 ASSAY OF PHOSPHORUS: CPT | Performed by: NURSE PRACTITIONER

## 2022-11-05 PROCEDURE — 93010 ELECTROCARDIOGRAM REPORT: CPT | Mod: ,,, | Performed by: INTERNAL MEDICINE

## 2022-11-05 PROCEDURE — 25000003 PHARM REV CODE 250: Performed by: NURSE PRACTITIONER

## 2022-11-05 PROCEDURE — 11000001 HC ACUTE MED/SURG PRIVATE ROOM

## 2022-11-05 PROCEDURE — 93005 ELECTROCARDIOGRAM TRACING: CPT

## 2022-11-05 PROCEDURE — 80053 COMPREHEN METABOLIC PANEL: CPT | Performed by: NURSE PRACTITIONER

## 2022-11-05 PROCEDURE — 93010 EKG 12-LEAD: ICD-10-PCS | Mod: ,,, | Performed by: INTERNAL MEDICINE

## 2022-11-05 RX ORDER — LANOLIN ALCOHOL/MO/W.PET/CERES
400 CREAM (GRAM) TOPICAL ONCE
Status: COMPLETED | OUTPATIENT
Start: 2022-11-05 | End: 2022-11-05

## 2022-11-05 RX ORDER — METRONIDAZOLE 500 MG/1
500 TABLET ORAL EVERY 8 HOURS
Status: DISCONTINUED | OUTPATIENT
Start: 2022-11-05 | End: 2022-11-06 | Stop reason: HOSPADM

## 2022-11-05 RX ORDER — HYDROCORTISONE 25 MG/G
CREAM TOPICAL 2 TIMES DAILY
Status: DISCONTINUED | OUTPATIENT
Start: 2022-11-05 | End: 2022-11-06 | Stop reason: HOSPADM

## 2022-11-05 RX ORDER — POTASSIUM CHLORIDE 20 MEQ/1
40 TABLET, EXTENDED RELEASE ORAL 2 TIMES DAILY
Status: COMPLETED | OUTPATIENT
Start: 2022-11-05 | End: 2022-11-06

## 2022-11-05 RX ORDER — FUROSEMIDE 10 MG/ML
40 INJECTION INTRAMUSCULAR; INTRAVENOUS ONCE
Status: COMPLETED | OUTPATIENT
Start: 2022-11-05 | End: 2022-11-05

## 2022-11-05 RX ORDER — LACTULOSE 10 G/15ML
20 SOLUTION ORAL ONCE
Status: COMPLETED | OUTPATIENT
Start: 2022-11-05 | End: 2022-11-05

## 2022-11-05 RX ORDER — HYDROCORTISONE ACETATE 25 MG/1
25 SUPPOSITORY RECTAL 2 TIMES DAILY
Status: COMPLETED | OUTPATIENT
Start: 2022-11-05 | End: 2022-11-05

## 2022-11-05 RX ADMIN — HYDROCORTISONE ACETATE 25 MG: 25 SUPPOSITORY RECTAL at 02:11

## 2022-11-05 RX ADMIN — FUROSEMIDE 40 MG: 10 INJECTION, SOLUTION INTRAMUSCULAR; INTRAVENOUS at 04:11

## 2022-11-05 RX ADMIN — PANTOPRAZOLE SODIUM 40 MG: 40 TABLET, DELAYED RELEASE ORAL at 10:11

## 2022-11-05 RX ADMIN — DOCUSATE SODIUM 100 MG: 100 CAPSULE, LIQUID FILLED ORAL at 10:11

## 2022-11-05 RX ADMIN — METRONIDAZOLE 500 MG: 500 TABLET ORAL at 02:11

## 2022-11-05 RX ADMIN — HYDROCORTISONE: 25 CREAM TOPICAL at 10:11

## 2022-11-05 RX ADMIN — DOCUSATE SODIUM 100 MG: 100 CAPSULE, LIQUID FILLED ORAL at 09:11

## 2022-11-05 RX ADMIN — Medication 1 TABLET: at 09:11

## 2022-11-05 RX ADMIN — PANTOPRAZOLE SODIUM 40 MG: 40 TABLET, DELAYED RELEASE ORAL at 09:11

## 2022-11-05 RX ADMIN — POTASSIUM CHLORIDE 40 MEQ: 1500 TABLET, EXTENDED RELEASE ORAL at 04:11

## 2022-11-05 RX ADMIN — ATORVASTATIN CALCIUM 40 MG: 40 TABLET, FILM COATED ORAL at 09:11

## 2022-11-05 RX ADMIN — METOPROLOL SUCCINATE 12.5 MG: 25 TABLET, EXTENDED RELEASE ORAL at 09:11

## 2022-11-05 RX ADMIN — Medication 1 ENEMA: at 02:11

## 2022-11-05 RX ADMIN — POLYETHYLENE GLYCOL 3350 17 G: 17 POWDER, FOR SOLUTION ORAL at 09:11

## 2022-11-05 RX ADMIN — IRON SUCROSE 200 MG: 20 INJECTION, SOLUTION INTRAVENOUS at 09:11

## 2022-11-05 RX ADMIN — HYDROCORTISONE ACETATE 25 MG: 25 SUPPOSITORY RECTAL at 10:11

## 2022-11-05 RX ADMIN — CEFTRIAXONE 1 G: 1 INJECTION, SOLUTION INTRAVENOUS at 12:11

## 2022-11-05 RX ADMIN — METRONIDAZOLE 500 MG: 500 TABLET ORAL at 10:11

## 2022-11-05 RX ADMIN — Medication 400 MG: at 04:11

## 2022-11-05 RX ADMIN — LACTULOSE 20 G: 20 SOLUTION ORAL at 04:11

## 2022-11-05 RX ADMIN — POTASSIUM BICARBONATE 50 MEQ: 978 TABLET, EFFERVESCENT ORAL at 09:11

## 2022-11-05 NOTE — PLAN OF CARE
Patient remained free from injury. Bed alarm set. Family at bedside. No s/s of acute distress. 12hr chart check complete.

## 2022-11-05 NOTE — PLAN OF CARE
Remains injury free. Denies c/o pain. Patient has been having small, loose stools throughout the day. Enema administered with little results. Lactulose administered per orders, no results as of yet.   Patient became short of breath while sitting up. Patient allowed placement of heart monitor, A-fib , HR 70's.   Will monitor.

## 2022-11-05 NOTE — ASSESSMENT & PLAN NOTE
Patient with acute on chronic anemia. Good response to 1u PRBC.  Likely multifactorial   CT showing possible proctocolitis and moderate fecal material, likely from rectal stool burden/sterocal ulcer.    - no plans for endoscopy given pt is high risk and hemoglobin has remained relatively stable  - agree with good bowel regimen to include enemas if needed  - can do short course of hydrocortisone suppositories for findings on CT scan  - plan discussed with family at bedside and hospital medicine

## 2022-11-05 NOTE — CONSULTS
[facility] - Med Surg  Adult Nutrition  Consult Note    SUMMARY     Recommendations  1. Advance PO diet to diabetic, cardiac diet, texture per SLP as soon as medically appropriate.     2. weigh weekly     3. re-consult for nutrition support if unable to advance PO diet to full liquids in < 5 days.  4. Recommend the pt receives Tayo BID on tray with meal when PO diet is advanced    Goals:   1. Pt PO diet advanced to full liquids in < 5 days  Nutrition Goal Status: new  Communication of RD Recs: other (comment) (POC: Sticky note)    Assessment and Plan  Nutrition Problem:  Moderate Protein-Calorie Malnutrition  Malnutrition in the context of Acute Illness/Injury    Related to (etiology):  Moderate muscle wasting, Moderate fat depletion, 8% > 1 month wt loss    Signs and Symptoms (as evidenced by):  Energy Intake: of estimated energy requirement for   Body Fat Depletion: mild depletion of   Muscle Mass Depletion: mild and moderate depletion of temples, clavicle region, and scapular region   Weight Loss: 8% x 1 month   Fluid Accumulation:    Interventions(treatment strategy):  1. Advance PO diet to diabetic, cardiac diet, texture per SLP as soon as medically appropriate.     2. weigh weekly     3. re-consult for nutrition support if unable to advance PO diet to full liquids in < 5 days  4. Recommend the pt receives Tayo BID on tray with meal when PO diet is advanced  5. Collaboration of care with medical provider.    Nutrition Diagnosis Status:  New      Malnutrition Assessment  Malnutrition Type: chronic illness          Weight Loss (Malnutrition): greater than 5% in 1 month  Muscle Mass (Malnutrition): moderate depletion   Upper Arm Region (Subcutaneous Fat Loss): well nourished   Scientologist Region (Muscle Loss): moderate depletion  Clavicle Bone Region (Muscle Loss): mild depletion  Clavicle and Acromion Bone Region (Muscle Loss): mild depletion  Scapular Bone Region (Muscle Loss): moderate depletion  Dorsal Hand (Muscle  Loss): well nourished  Posterior Calf Region (Muscle Loss): well nourished   Edema (Fluid Accumulation): 0-->no edema present   Subcutaneous Fat Loss (Final Summary): mild protein-calorie malnutrition  Muscle Loss Evaluation (Final Summary): moderate protein-calorie malnutrition    Severe Weight Loss (Malnutrition): greater than 5% in 1 month    Reason for Assessment    Reason For Assessment: consult, identified at risk by screening criteria  Diagnosis: gastrointestinal disease  Relevant Medical History: Iron deficiency anemia, T2DM, HTN  General Information Comments:   11/4: 89 y.o female admitted d/t GI bleed. RD consulted d/t concerns of malnutrition. NFPE preformed, Moderate wasting noticed in the temporal and scapula areas. Per the pt son, the pt has lost 10lbs gradually over the last month 8% >1 month. The pt son reports that his mother typically consumes an ensure for breakfast, her daily lunches are provided by meals on wheels, and his brother who stays with his mom prepares dinners. RD encouraged the pt son to ensure that his mother is consuming 4-5 small frequent meals throughout the day. Pt is currently on clear liquid diet. The pt son reports that his mother appetite was poor yesterday d/t not feeling well however, believes that her appetite is returning since being admitted into the hospital. The pt denies v/n, abdominal distention, chewing and swallowing difficulties. Pt has small pressure injury to her sacrum area, RD will recommend the pt receives Tayo BID when PO diet is advanced. RD recommended to the pt son to use Tayo for his mother for at least two weeks. Will continue to monitor.  Nutrition Discharge Planning: Liberalize general healthy diet    Nutrition Risk Screen    Nutrition Risk Screen: large or nonhealing wound, burn or pressure injury    Nutrition/Diet History    Spiritual, Cultural Beliefs, Jain Practices, Values that Affect Care: no  Food Allergies: NKFA  Factors Affecting  "Nutritional Intake: clear liquid diet    Anthropometrics    Temp: 97.7 °F (36.5 °C)  Height Method: Stated  Height: 5' 6" (167.6 cm)  Height (inches): 66 in  Weight Method: Bed Scale  Weight: 51.3 kg (113 lb 1.5 oz)  Weight (lb): 113.1 lb  Ideal Body Weight (IBW), Female: 130 lb  % Ideal Body Weight, Female (lb): 87 %  BMI (Calculated): 18.3  BMI Grade: 17 - 18.4 protein-energy malnutrition grade I       Lab/Procedures/Meds  BMP  Lab Results   Component Value Date     11/04/2022    K 2.8 (L) 11/04/2022     11/04/2022    CO2 24 11/04/2022    BUN 14 11/04/2022    CREATININE 0.8 11/04/2022    CALCIUM 8.6 (L) 11/04/2022    ANIONGAP 8 11/04/2022    EGFRNONAA 50 07/28/2022     Hemoglobin A1C   Date Value Ref Range Status   03/03/2022 8.2 (H) 4.8 - 5.6 % Final     Hemoglobin A1c   Date Value Ref Range Status   04/19/2018 5.2 4.5 - 6.2 % Final   09/14/2017 5.3 4.2 - 6.3 % Final   09/10/2017 5.2 4.2 - 6.3 % Final      Pertinent Labs Reviewed: reviewed  Pertinent Medications Reviewed: reviewed  Scheduled Meds:   atorvastatin  40 mg Oral Daily    cefTRIAXone (ROCEPHIN) IVPB  1 g Intravenous Q24H    metoprolol succinate  12.5 mg Oral Daily    multivit-min-ferrous gluconate  15 mL Oral Daily    pantoprazole  40 mg Oral BID    potassium, sodium phosphates  2 packet Oral TID     Continuous Infusions:  PRN Meds:.acetaminophen, albuterol-ipratropium, aluminum-magnesium hydroxide-simethicone, dextrose 10%, dextrose 10%, glucagon (human recombinant), glucose, glucose, HYDROcodone-acetaminophen, magnesium oxide, magnesium oxide, melatonin, naloxone, ondansetron, polyethylene glycol, potassium bicarbonate, potassium bicarbonate, potassium bicarbonate, potassium, sodium phosphates, potassium, sodium phosphates, potassium, sodium phosphates, prochlorperazine, senna-docusate 8.6-50 mg, simethicone, sodium chloride 0.9%    Estimated/Assessed Needs    Weight Used For Calorie Calculations: 51.3 kg (113 lb 1.5 oz)  Energy Calorie " Requirements (kcal): 1115.7 (MSJ, AF x 1.2)  Energy Need Method: [place]  Protein Requirements: 51-64 (1.0-1.25g/kg)  Weight Used For Protein Calculations: 51.3 kg (113 lb 1.5 oz)  Fluid Requirements (mL): 1116  Estimated Fluid Requirement Method: RDA Method  RDA Method (mL): 1115.7  CHO Requirement: 139      Nutrition Prescription Ordered    Current Diet Order: Clear liquid    Evaluation of Received Nutrient/Fluid Intake    % Kcal Needs: 10  % Protein Needs: 10  I/O: No I/O currently charted for patient  Energy Calories Required: not meeting needs  Protein Required: not meeting needs  Fluid Required: meeting needs  % Intake of Estimated Energy Needs: 0 - 25 %  % Meal Intake: 75 - 100 %    Nutrition Risk    Level of Risk/Frequency of Follow-up: high       Monitor and Evaluation    Food and Nutrient Intake: energy intake, food and beverage intake  Food and Nutrient Adminstration: diet order  Knowledge/Beliefs/Attitudes: food and nutrition knowledge/skill, beliefs and attitudes  Anthropometric Measurements: weight, weight change, body mass index  Biochemical Data, Medical Tests and Procedures: electrolyte and renal panel, gastrointestinal profile, glucose/endocrine profile, inflammatory profile, lipid profile  Nutrition-Focused Physical Findings: overall appearance       Nutrition Follow-Up    RD Follow-up?: Yes  Manny Camachoitian

## 2022-11-05 NOTE — ASSESSMENT & PLAN NOTE
Patient with Paroxysmal (<7 days) atrial fibrillation which is controlled currently with none. Patient is currently in atrial fibrillation.[id number] Score: 4. HASBLED Score: elevated.     Repeat EKG/Troponin   Consult cardiology   Echo ordered   11/5-   Echo resulted LVEF 60%, G1 DD, severe AS with valve area 0.79, mean gradient 47  Currently rate controlled  Not a candidate for long term AC

## 2022-11-05 NOTE — ASSESSMENT & PLAN NOTE
Echo done today confirmed severe aortic stenosis with valve area 0.79  Supportive treatment given advanced age

## 2022-11-05 NOTE — SUBJECTIVE & OBJECTIVE
Past Medical History:   Diagnosis Date    Anemia, unspecified     Aortic stenosis     CVA (cerebral vascular accident)     Essential (primary) hypertension     Mixed hyperlipidemia        Past Surgical History:   Procedure Laterality Date    APPENDECTOMY      CATARACT EXTRACTION      CHOLECYSTECTOMY      HYSTERECTOMY         Review of patient's allergies indicates:   Allergen Reactions    Sulfa (sulfonamide antibiotics)      Questionable in record       No current facility-administered medications on file prior to encounter.     Current Outpatient Medications on File Prior to Encounter   Medication Sig    amLODIPine (NORVASC) 2.5 MG tablet Take 2.5 mg by mouth once daily.    atorvastatin (LIPITOR) 40 MG tablet Take 1 tablet by mouth once daily.    cranberry fruit concentrate 250 mg Chew Take by mouth.    multivit/folic acid/vit K1 (ONE-A-DAY WOMEN'S 50 PLUS ORAL) Take 1 tablet by mouth once daily.    olmesartan (BENICAR) 40 MG tablet Take 40 mg by mouth once daily.    [DISCONTINUED] aspirin (ECOTRIN) 81 MG EC tablet Take 81 mg by mouth.     Family History       Problem Relation (Age of Onset)    Alzheimer's disease Mother    Heart attack Father          Tobacco Use    Smoking status: Never    Smokeless tobacco: Not on file   Substance and Sexual Activity    Alcohol use: Never    Drug use: Not on file    Sexual activity: Not on file     Review of Systems   Unable to perform ROS: Dementia   Constitutional: Positive for malaise/fatigue.   Hematologic/Lymphatic: Bruises/bleeds easily.   Gastrointestinal:         Frequent stools   Psychiatric/Behavioral:  Positive for memory loss.    Allergic/Immunologic: Negative.    Objective:     Vital Signs (Most Recent):  Temp: 98 °F (36.7 °C) (11/05/22 1200)  Pulse: 66 (11/05/22 1200)  Resp: 17 (11/05/22 1200)  BP: 131/63 (11/05/22 1200)  SpO2: 99 % (11/05/22 1200) Vital Signs (24h Range):  Temp:  [97 °F (36.1 °C)-98.2 °F (36.8 °C)] 98 °F (36.7 °C)  Pulse:  [52-91] 66  Resp:   [17-18] 17  SpO2:  [98 %-99 %] 99 %  BP: (131-147)/(62-69) 131/63     Weight: 51.3 kg (113 lb 1.5 oz)  Body mass index is 18.25 kg/m².    SpO2: 99 %  O2 Device (Oxygen Therapy): room air      Intake/Output Summary (Last 24 hours) at 11/5/2022 1506  Last data filed at 11/5/2022 1248  Gross per 24 hour   Intake 480 ml   Output --   Net 480 ml       Lines/Drains/Airways       Peripheral Intravenous Line  Duration                  Peripheral IV - Single Lumen 11/05/22 1158 22 G Anterior;Left Forearm <1 day                    Physical Exam  Vitals and nursing note reviewed.   Constitutional:       General: She is not in acute distress.     Appearance: She is well-developed. She is not diaphoretic.      Comments: Elderly, frail appearing   HENT:      Head: Normocephalic and atraumatic.   Eyes:      General:         Right eye: No discharge.         Left eye: No discharge.      Pupils: Pupils are equal, round, and reactive to light.   Neck:      Thyroid: No thyromegaly.      Vascular: No JVD.      Trachea: No tracheal deviation.   Cardiovascular:      Rate and Rhythm: Normal rate and regular rhythm. Extrasystoles are present.     Heart sounds: S1 normal and S2 normal. Murmur heard.   Harsh midsystolic murmur is present at the upper right sternal border radiating to the neck.   Pulmonary:      Effort: Pulmonary effort is normal. No respiratory distress.      Breath sounds: Normal breath sounds. No wheezing or rales.   Abdominal:      General: There is no distension.      Palpations: Abdomen is soft.      Tenderness: There is no rebound.   Musculoskeletal:      Cervical back: Neck supple.      Right lower leg: No edema.      Left lower leg: No edema.   Skin:     General: Skin is warm and dry.      Coloration: Skin is pale.      Findings: No erythema.   Neurological:      General: No focal deficit present.      Mental Status: She is alert and oriented to person, place, and time.   Psychiatric:         Mood and Affect: Mood  normal.         Behavior: Behavior normal.         Thought Content: Thought content normal.       Significant Labs: CMP   Recent Labs   Lab 11/03/22 1732 11/04/22  0547 11/05/22  0557    138 141   K 2.6* 2.8* 3.1*   CL  --  106 109   CO2 23 24 24   GLU  --  72 60*   BUN 16.3 14 13   CREATININE 1.01 0.8 0.8   CALCIUM 8.3* 8.6* 8.1*   PROT  --  5.8* 4.8*   ALBUMIN 1.6* 1.9* 1.7*   BILITOT 0.6 0.9 0.7   ALKPHOS 95 96 92   AST 37* 36 43*   ALT 23 24 30   ANIONGAP  --  8 8     , CBC   Recent Labs   Lab 11/03/22 1732 11/04/22 0547 11/04/22 0547 11/05/22  0557   WBC 6.8 8.62  --  7.81   HGB 7.6* 9.3*  --  8.3*   HCT 23.7* 28.7*   < > 26.4*    244  --  200    < > = values in this interval not displayed.     , INR No results for input(s): INR, PROTIME in the last 48 hours., Troponin   Recent Labs   Lab 11/04/22 0547 11/04/22  0933   TROPONINI 0.288* 0.243*     , and All pertinent lab results from the last 24 hours have been reviewed.    Significant Imaging: Echocardiogram: Transthoracic echo (TTE) complete ([facility] Only):   Results for orders placed or performed during the hospital encounter of 11/04/22   Echo   Result Value Ref Range    BSA 1.54 m2    TDI SEPTAL 0.06 m/s    LV LATERAL E/E' RATIO 18.13 m/s    LV SEPTAL E/E' RATIO 24.17 m/s    IVC diameter 1.72 cm    Left Ventricular Outflow Tract Mean Velocity 0.87 cm/s    Left Ventricular Outflow Tract Mean Gradient 3.24 mmHg    TDI LATERAL 0.08 m/s    LVIDd 3.88 3.5 - 6.0 cm    IVS 1.37 (A) 0.6 - 1.1 cm    Posterior Wall 1.53 (A) 0.6 - 1.1 cm    Ao root annulus 2.62 cm    LVIDs 2.64 2.1 - 4.0 cm    FS 32 28 - 44 %    STJ 2.65 cm    Ascending aorta 3.76 cm    LV mass 211.31 g    LA size 3.34 cm    RVDD 2.93 cm    RV S' 0.01 cm/s    Left Ventricle Relative Wall Thickness 0.79 cm    AV regurgitation pressure 1/2 time [id number] ms    AV mean gradient 47 mmHg    AV valve area 0.79 cm2    AV Velocity Ratio 0.26     AV index (prosthetic) 0.25     E/A  ratio 0.75     Mean e' 0.07 m/s    E wave deceleration time 180.27 msec    IVRT 45.67 msec    LVOT diameter 2.01 cm    LVOT area 3.2 cm2    LVOT peak peña 1.12 m/s    LVOT peak VTI 30.90 cm    Ao peak peña 4.37 m/s    Ao .5 cm    RVOT peak peña 0.83 m/s    RVOT peak VTI 21.9 cm    Mr max peña 5.96 m/s    LVOT stroke volume 98.00 cm3    AV peak gradient 76 mmHg    PV mean gradient 1.48 mmHg    E/E' ratio 20.71 m/s    MV Peak E Peña 1.45 m/s    AR Max Peña 4.20 m/s    TR Max Peña 3.23 m/s    MV Peak A Peña 1.93 m/s    LV Systolic Volume 25.54 mL    LV Systolic Volume Index 16.3 mL/m2    LV Diastolic Volume 65.22 mL    LV Diastolic Volume Index 41.54 mL/m2    LV Mass Index 135 g/m2    RA Major Axis 5.08 cm    Left Atrium Minor Axis 5.12 cm    Left Atrium Major Axis 5.14 cm    Triscuspid Valve Regurgitation Peak Gradient 42 mmHg    LA Volume Index (Mod) 31.6 mL/m2    LA volume (mod) 49.57 cm3    LA WIDTH 4.50 cm    LA volume 65.54 cm3    TAPSE 2.10 cm    LA Volume Index 41.7 mL/m2    RA Width 3.00 cm    Right Atrial Pressure (from IVC) 3 mmHg    EF 60 %    TV rest pulmonary artery pressure 45 mmHg    Narrative    · The left ventricle is normal in size with concentric hypertrophy and   normal systolic function.  · Moderate left atrial enlargement.  · The estimated ejection fraction is 60%.  · Grade I left ventricular diastolic dysfunction.  · Normal right ventricular size with normal right ventricular systolic   function.  · Mild right atrial enlargement.  · Moderate aortic regurgitation.  · There is severe aortic valve stenosis.  · Aortic valve area is 0.79 cm2; peak velocity is 4.37 m/s; mean gradient   is 47 mmHg.  · Mild mitral regurgitation.  · Mild tricuspid regurgitation.  · Normal central venous pressure (3 mmHg).  · The estimated PA systolic pressure is 45 mmHg.  · There is pulmonary hypertension.      , EKG: Reviewed, and X-Ray: CXR: X-Ray Chest 1 View (CXR): No results found for this visit on 11/04/22. and  X-Ray Chest PA and Lateral (CXR): No results found for this visit on 11/04/22.

## 2022-11-05 NOTE — PROGRESS NOTES
[facility] Medicine  Progress Note    Patient Name: Syeda Dunlap  MRN: [id number]  Patient Class: IP- Inpatient   Admission Date: 11/4/2022  Length of Stay: 1 days  Attending Physician: Ray Hyman MD  Primary Care Provider: Montse Arciniega NP        Subjective:     Principal Problem:GI bleed        HPI:  The patient is a 89 y.o. female with Dementia, CVA- no deficits, Aortic stenosis, HTN, and HLD who presented to [facility] ED with generalized weakness, fatigue, pale skin, and frequent stools. Family unsure if stools are black or bloody.   She was found to have new onset Atrial fibrillation -rate 90. Hemoglobin below baseline at 7.6. Stool positive for occult blood, BP stable. Started on IV protonix and will get 1 unit PRBC transfusion. Potassium has been replaced. Transferred to [facility] for GI evaluation.     Of note, the family reports the pt was found to have anemia 6/2022. She was transfused and followed up with GI who recommended to hold ASA and treat conservatively. Pt was also recently noted to have low blood pressure. PCP recommended to hold home meds Amlodipine and Losartan.     The patient has dementia. Her son, Kervin Dunlap, has POA.     The patient will be placed in observation under hospital medicine           Overview/Hospital Course:  Admitted for further evaluation of increased generalized weakness and anemia with Hgb 7.6 with baseline around 10. No clear h/o melena or bright red blood per rectum this time but stool occult blood is positive. S/P 1 units of P-RBC overnight with repeat Hgb 9.3 this morning. CT abd/pelvis suggestive of moderate fecal material in the rectum  and proctocolitis. GI consulted and suggested lower endoscopy this admit once cleared by Cardiology. Pt is noted to have new onset Atrial fibrillation with controlled rate. Echo done today confirmed severe aortic stenosis with valve area 0.79.     11/5- No overt bleeding reported.  Stool reportedly loose and brown. Hgb is 8.3 today. Iron study is suggestive of anemia of chronic disease with iron deficiency. IV iron initiated. GI aborted plan for endoscopy given pt deemed high risk per Cardiology  with h/o severe aortic stenosis. Bowel regimen initiated for fecal impaction and surrounding inflammation in the rectum.  K.3.1. Replacement in progress. Urine culture growing GNR. Rocephin day #2.       Interval History:   GI aborted plan for endoscopy given pt deemed high risk per Cardiology  with h/o severe aortic stenosis      Review of Systems   Unable to perform ROS: Other   Constitutional:  Positive for activity change and fatigue. Negative for appetite change and fever.   HENT:  Negative for sore throat.    Eyes:  Negative for visual disturbance.   Respiratory:  Negative for cough, chest tightness and shortness of breath.    Cardiovascular:  Negative for chest pain, palpitations and leg swelling.   Gastrointestinal:  Negative for abdominal distention, abdominal pain, blood in stool, constipation, diarrhea, nausea and vomiting.        Loose stool   Endocrine: Negative for polyuria.   Genitourinary:  Positive for frequency. Negative for decreased urine volume, dysuria, flank pain and hematuria.   Musculoskeletal:  Negative for back pain and gait problem.   Skin:  Negative for rash.   Neurological:  Negative for syncope, speech difficulty, weakness, light-headedness and headaches.        Dementia    Psychiatric/Behavioral:  Negative for confusion, hallucinations and sleep disturbance.    Objective:     Vital Signs (Most Recent):  Temp: 98 °F (36.7 °C) (11/05/22 1200)  Pulse: 66 (11/05/22 1200)  Resp: 17 (11/05/22 1200)  BP: 131/63 (11/05/22 1200)  SpO2: 99 % (11/05/22 1200) Vital Signs (24h Range):  Temp:  [97 °F (36.1 °C)-98.2 °F (36.8 °C)] 98 °F (36.7 °C)  Pulse:  [52-91] 66  Resp:  [17-18] 17  SpO2:  [98 %-99 %] 99 %  BP: (131-147)/(62-69) 131/63     Weight: 51.3 kg (113 lb 1.5 oz)  Body mass  index is 18.25 kg/m².    Intake/Output Summary (Last 24 hours) at 11/5/2022 1413  Last data filed at 11/5/2022 1248  Gross per 24 hour   Intake 480 ml   Output --   Net 480 ml      Physical Exam  Vitals and nursing note reviewed.   Constitutional:       General: She is not in acute distress.     Appearance: She is well-developed. She is not diaphoretic.   HENT:      Head: Normocephalic and atraumatic.      Nose: Nose normal.   Eyes:      General: No scleral icterus.     Conjunctiva/sclera: Conjunctivae normal.   Neck:      Trachea: No tracheal deviation.   Cardiovascular:      Rate and Rhythm: Normal rate and regular rhythm.      Heart sounds: Murmur heard.     No friction rub. No gallop.   Pulmonary:      Effort: Pulmonary effort is normal. No respiratory distress.      Breath sounds: Normal breath sounds. No stridor. No wheezing or rales.   Chest:      Chest wall: No tenderness.   Abdominal:      General: Bowel sounds are normal. There is no distension.      Palpations: Abdomen is soft. There is no mass.      Tenderness: There is no abdominal tenderness. There is no guarding or rebound.   Musculoskeletal:         General: No tenderness or deformity. Normal range of motion.      Cervical back: Normal range of motion and neck supple.      Right lower leg: No edema.      Left lower leg: No edema.   Skin:     General: Skin is warm and dry.      Coloration: Skin is pale.      Findings: No erythema or rash.   Neurological:      General: No focal deficit present.      Mental Status: She is alert. Mental status is at baseline.      Cranial Nerves: No cranial nerve deficit.      Motor: No abnormal muscle tone.      Coordination: Coordination normal.      Comments: Oriented to self, person and place   Psychiatric:         Behavior: Behavior normal.         Thought Content: Thought content normal.         Cognition and Memory: Cognition is impaired.       Significant Labs: All pertinent labs within the past 24 hours have  been reviewed.  BMP:   Recent Labs   Lab 11/05/22  0557   GLU 60*      K 3.1*      CO2 24   BUN 13   CREATININE 0.8   CALCIUM 8.1*   MG 1.7     CBC:   Recent Labs   Lab 11/03/22  1732 11/04/22  0547 11/05/22  0557   WBC 6.8 8.62 7.81   HGB 7.6* 9.3* 8.3*   HCT 23.7* 28.7* 26.4*    244 200     CMP:   Recent Labs   Lab 11/03/22  1732 11/04/22  0547 11/05/22  0557    138 141   K 2.6* 2.8* 3.1*   CL  --  106 109   CO2 23 24 24   GLU  --  72 60*   BUN 16.3 14 13   CREATININE 1.01 0.8 0.8   CALCIUM 8.3* 8.6* 8.1*   PROT  --  5.8* 4.8*   ALBUMIN 1.6* 1.9* 1.7*   BILITOT 0.6 0.9 0.7   ALKPHOS 95 96 92   AST 37* 36 43*   ALT 23 24 30   ANIONGAP  --  8 8       Significant Imaging:       Assessment/Plan:      * GI bleed  Serial H/H   GI consult   protonix   11/4-  S/P 1 unit of P-RBC overnight . Hgb 9.3 >7.6  GI consulted and suggested lower endoscopy this admit once cleared by Cardiology  11/5-   No signs of overt GI bleed. Stool is brown   GI aborted plan for endoscopy given high risk candidate with h/o severe aortic stenosis.\  Continue supportive treatment with blood transfusion as needed     Iron deficiency anemia due to chronic blood loss  Pt received one unit PRBC   Monitor counts   Pt could have GI AVM given h/o severe aortic stenosis, however endoscopic intervention not offered given high risk   Continue supportive treatment with blood transfusion as needed       New onset a-fib  Patient with Paroxysmal (<7 days) atrial fibrillation which is controlled currently with none. Patient is currently in atrial fibrillation.[id number] Score: 4. HASBLED Score: elevated.     Repeat EKG/Troponin   Consult cardiology   Echo ordered   11/5-   Echo resulted LVEF 60%, G1 DD, severe AS with valve area 0.79, mean gradient 47  Currently rate controlled  Not a candidate for long term AC      Aortic stenosis  Echo done today confirmed severe aortic stenosis with valve area 0.79  Supportive treatment given  advanced age       Elevated troponin  -In the setting of anemia and severe aortic stenosis   -Trend troponin   -ASA not offered given GI bleed  Continue statin       History of stroke  ASA on hold due to presumed GI bleed since 6/2022  Cont Statin       UTI (urinary tract infection)  -UA suggestive of UTI  -Rocephin 1 gram IV x 3 doses   -Follow up urine culture         VTE Risk Mitigation (From admission, onward)         Ordered     IP VTE HIGH RISK PATIENT  Once         11/04/22 0542     Place sequential compression device  Until discontinued         11/04/22 0542     Reason for No Pharmacological VTE Prophylaxis  Once        Question:  Reasons:  Answer:  Active Bleeding    11/04/22 0542                Discharge Planning   CODY:      Code Status: Full Code   Is the patient medically ready for discharge?:     Reason for patient still in hospital (select all that apply): Patient trending condition  Discharge Plan A: Rehab                  Ray Hyman MD  Department of Hospital Medicine   [facility] - Med Surg

## 2022-11-05 NOTE — ASSESSMENT & PLAN NOTE
-EKG's reviewed, lots of underlying artifact/noise, appears to show SR with PAC's  -Repeat pending  -Regardless, patient is not a candidate for AC at present time given GIB/anemia issues  -Echo pending    11/5/22   - repeat ECG c/w NSR with PACs; ECHO with severe AS;

## 2022-11-05 NOTE — PLAN OF CARE
Interventions(treatment strategy):  1. Advance PO diet to diabetic, cardiac diet, texture per SLP as soon as medically appropriate.     2. weigh weekly     3. re-consult for nutrition support if unable to advance PO diet to full liquids in < 5 days  4. Recommend the pt receives Tayo BID on tray with meal when PO diet is advanced  5. Collaboration of care with medical provider.    Basilio Ceballos, Dietitian

## 2022-11-05 NOTE — SUBJECTIVE & OBJECTIVE
Subjective:     Interval History: no events overnight.  Started on antibiotics for UTI and stool softeners    Review of Systems   All other systems reviewed and are negative.  Objective:     Vital Signs (Most Recent):  Temp: 97 °F (36.1 °C) (11/05/22 0908)  Pulse: 62 (11/05/22 0909)  Resp: 18 (11/05/22 0908)  BP: (!) 146/64 (11/05/22 0908)  SpO2: 98 % (11/05/22 0908)   Vital Signs (24h Range):  Temp:  [97 °F (36.1 °C)-98.2 °F (36.8 °C)] 97 °F (36.1 °C)  Pulse:  [52-91] 62  Resp:  [18] 18  SpO2:  [98 %-99 %] 98 %  BP: (144-147)/(62-69) 146/64     Weight: 51.3 kg (113 lb 1.5 oz) (11/04/22 1815)  Body mass index is 18.25 kg/m².      Intake/Output Summary (Last 24 hours) at 11/5/2022 1132  Last data filed at 11/5/2022 0854  Gross per 24 hour   Intake 360 ml   Output --   Net 360 ml       Lines/Drains/Airways       Peripheral Intravenous Line  Duration                  Peripheral IV - Single Lumen 11/04/22 0000 20 G Anterior;Left;Proximal Forearm 1 day                    Physical Exam  Constitutional:       Appearance: Normal appearance.   HENT:      Head: Normocephalic and atraumatic.   Eyes:      General: No scleral icterus.     Extraocular Movements: Extraocular movements intact.   Cardiovascular:      Pulses: Normal pulses.   Pulmonary:      Breath sounds: Normal breath sounds. No wheezing.   Abdominal:      General: There is no distension.      Tenderness: There is abdominal tenderness.   Musculoskeletal:         General: No swelling or tenderness.   Skin:     General: Skin is warm and dry.   Neurological:      Mental Status: She is alert.       Significant Labs:  CBC:   Recent Labs   Lab 11/03/22  1732 11/04/22  0547 11/05/22  0557   WBC 6.8 8.62 7.81   HGB 7.6* 9.3* 8.3*   HCT 23.7* 28.7* 26.4*    244 200     CMP:   Recent Labs   Lab 11/05/22  0557   GLU 60*   CALCIUM 8.1*   ALBUMIN 1.7*   PROT 4.8*      K 3.1*   CO2 24      BUN 13   CREATININE 0.8   ALKPHOS 92   ALT 30   AST 43*   BILITOT 0.7          Significant Imaging:  Imaging results within the past 24 hours have been reviewed.

## 2022-11-05 NOTE — ASSESSMENT & PLAN NOTE
Pt received one unit PRBC   Monitor counts   Pt could have GI AVM given h/o severe aortic stenosis, however endoscopic intervention not offered given high risk   Continue supportive treatment with blood transfusion as needed

## 2022-11-05 NOTE — ASSESSMENT & PLAN NOTE
Serial H/H   GI consult   protonix   11/4-  S/P 1 unit of P-RBC overnight . Hgb 9.3 >7.6  GI consulted and suggested lower endoscopy this admit once cleared by Cardiology  11/5-   No signs of overt GI bleed. Stool is brown   GI aborted plan for endoscopy given high risk candidate with h/o severe aortic stenosis.\  Continue supportive treatment with blood transfusion as needed

## 2022-11-05 NOTE — ASSESSMENT & PLAN NOTE
-+FOBT   -GI on board    - elevated CV risk for invasive procedures requiring anesthesia sec to severe AS

## 2022-11-05 NOTE — PROGRESS NOTES
"[facility]  Cardiology  Progress Note    Patient Name: Syeda Dunlap  MRN: [id number]  Admission Date: 11/4/2022  Hospital Length of Stay: 1 days  Code Status: Full Code   Attending Physician: Ray Hyman MD   Primary Care Physician: Montse Arciniega NP  Expected Discharge Date:   Principal Problem:GI bleed    Subjective:     Hospital Course:   HPI obtained from chart and with assistance of family member at bedside     Ms. Dunlap is a 89 year old female patient whose current medical conditions include dementia, prior CVA with no residual deficits, AS, HTN, and hyperlipidemia who initially presented to [facility] ED with generalized weakness, increased fatigue, and frequent stool. There was no report of any associated fever, chills, james chest pain, SOB, palpitations, near syncope, or syncope. Workup there revealed anemia, +FOBT, and questionable new onset afib noted on EKG. Patient was subsequently transferred to [facility] for GI evaluation. Cardiology consulted to assist with management. Patient seen and examined today, resting in bed. Feels ok, states she has to "use the bathroom". Denies any real CV symptoms-no chest pain. No prior history of CAD or MI. Family reported prior diagnosis of anemia in 6/22 and patient was advised to hold ASA and conservative mgmt was recommended at that time.     Chart reviewed. H/H improved to 9.3/28.7 post transfusion. Echo pending. EKG's reviewed, poor quality with underlying noise, appear to show SR with PAC's. Will try to obtain repeat/better quality.     Results for orders placed during the hospital encounter of 11/04/22    Echo    Interpretation Summary  · The left ventricle is normal in size with concentric hypertrophy and normal systolic function.  · Moderate left atrial enlargement.  · The estimated ejection fraction is 60%.  · Grade I left ventricular diastolic dysfunction.  · Normal right ventricular size with normal right ventricular " systolic function.  · Mild right atrial enlargement.  · Moderate aortic regurgitation.  · There is severe aortic valve stenosis.  · Aortic valve area is 0.79 cm2; peak velocity is 4.37 m/s; mean gradient is 47 mmHg.  · Mild mitral regurgitation.  · Mild tricuspid regurgitation.  · Normal central venous pressure (3 mmHg).  · The estimated PA systolic pressure is 45 mmHg.  · There is pulmonary hypertension.      11/5/22 - ECHO with severe AS; discussed high risk for any invasive procedures; will have Flex sig per GI to eval bleeding; Hgb stable 8.3; hemodyn stable       Past Medical History:   Diagnosis Date    Anemia, unspecified     Aortic stenosis     CVA (cerebral vascular accident)     Essential (primary) hypertension     Mixed hyperlipidemia        Past Surgical History:   Procedure Laterality Date    APPENDECTOMY      CATARACT EXTRACTION      CHOLECYSTECTOMY      HYSTERECTOMY         Review of patient's allergies indicates:   Allergen Reactions    Sulfa (sulfonamide antibiotics)      Questionable in record       No current facility-administered medications on file prior to encounter.     Current Outpatient Medications on File Prior to Encounter   Medication Sig    amLODIPine (NORVASC) 2.5 MG tablet Take 2.5 mg by mouth once daily.    atorvastatin (LIPITOR) 40 MG tablet Take 1 tablet by mouth once daily.    cranberry fruit concentrate 250 mg Chew Take by mouth.    multivit/folic acid/vit K1 (ONE-A-DAY WOMEN'S 50 PLUS ORAL) Take 1 tablet by mouth once daily.    olmesartan (BENICAR) 40 MG tablet Take 40 mg by mouth once daily.    [DISCONTINUED] aspirin (ECOTRIN) 81 MG EC tablet Take 81 mg by mouth.     Family History       Problem Relation (Age of Onset)    Alzheimer's disease Mother    Heart attack Father          Tobacco Use    Smoking status: Never    Smokeless tobacco: Not on file   Substance and Sexual Activity    Alcohol use: Never    Drug use: Not on file    Sexual activity: Not on file     Review of  Systems   Unable to perform ROS: Dementia   Constitutional: Positive for malaise/fatigue.   Hematologic/Lymphatic: Bruises/bleeds easily.   Gastrointestinal:         Frequent stools   Psychiatric/Behavioral:  Positive for memory loss.    Allergic/Immunologic: Negative.    Objective:     Vital Signs (Most Recent):  Temp: 98 °F (36.7 °C) (11/05/22 1200)  Pulse: 66 (11/05/22 1200)  Resp: 17 (11/05/22 1200)  BP: 131/63 (11/05/22 1200)  SpO2: 99 % (11/05/22 1200) Vital Signs (24h Range):  Temp:  [97 °F (36.1 °C)-98.2 °F (36.8 °C)] 98 °F (36.7 °C)  Pulse:  [52-91] 66  Resp:  [17-18] 17  SpO2:  [98 %-99 %] 99 %  BP: (131-147)/(62-69) 131/63     Weight: 51.3 kg (113 lb 1.5 oz)  Body mass index is 18.25 kg/m².    SpO2: 99 %  O2 Device (Oxygen Therapy): room air      Intake/Output Summary (Last 24 hours) at 11/5/2022 1506  Last data filed at 11/5/2022 1248  Gross per 24 hour   Intake 480 ml   Output --   Net 480 ml       Lines/Drains/Airways       Peripheral Intravenous Line  Duration                  Peripheral IV - Single Lumen 11/05/22 1158 22 G Anterior;Left Forearm <1 day                    Physical Exam  Vitals and nursing note reviewed.   Constitutional:       General: She is not in acute distress.     Appearance: She is well-developed. She is not diaphoretic.      Comments: Elderly, frail appearing   HENT:      Head: Normocephalic and atraumatic.   Eyes:      General:         Right eye: No discharge.         Left eye: No discharge.      Pupils: Pupils are equal, round, and reactive to light.   Neck:      Thyroid: No thyromegaly.      Vascular: No JVD.      Trachea: No tracheal deviation.   Cardiovascular:      Rate and Rhythm: Normal rate and regular rhythm. Extrasystoles are present.     Heart sounds: S1 normal and S2 normal. Murmur heard.   Harsh midsystolic murmur is present at the upper right sternal border radiating to the neck.   Pulmonary:      Effort: Pulmonary effort is normal. No respiratory distress.       Breath sounds: Normal breath sounds. No wheezing or rales.   Abdominal:      General: There is no distension.      Palpations: Abdomen is soft.      Tenderness: There is no rebound.   Musculoskeletal:      Cervical back: Neck supple.      Right lower leg: No edema.      Left lower leg: No edema.   Skin:     General: Skin is warm and dry.      Coloration: Skin is pale.      Findings: No erythema.   Neurological:      General: No focal deficit present.      Mental Status: She is alert and oriented to person, place, and time.   Psychiatric:         Mood and Affect: Mood normal.         Behavior: Behavior normal.         Thought Content: Thought content normal.       Significant Labs: CMP   Recent Labs   Lab 11/03/22 1732 11/04/22  0547 11/05/22  0557    138 141   K 2.6* 2.8* 3.1*   CL  --  106 109   CO2 23 24 24   GLU  --  72 60*   BUN 16.3 14 13   CREATININE 1.01 0.8 0.8   CALCIUM 8.3* 8.6* 8.1*   PROT  --  5.8* 4.8*   ALBUMIN 1.6* 1.9* 1.7*   BILITOT 0.6 0.9 0.7   ALKPHOS 95 96 92   AST 37* 36 43*   ALT 23 24 30   ANIONGAP  --  8 8     , CBC   Recent Labs   Lab 11/03/22 1732 11/04/22 0547 11/04/22  0547 11/05/22  0557   WBC 6.8 8.62  --  7.81   HGB 7.6* 9.3*  --  8.3*   HCT 23.7* 28.7*   < > 26.4*    244  --  200    < > = values in this interval not displayed.     , INR No results for input(s): INR, PROTIME in the last 48 hours., Troponin   Recent Labs   Lab 11/04/22  0547 11/04/22  0933   TROPONINI 0.288* 0.243*     , and All pertinent lab results from the last 24 hours have been reviewed.    Significant Imaging: Echocardiogram: Transthoracic echo (TTE) complete (Cupid Only):   Results for orders placed or performed during the hospital encounter of 11/04/22   Echo   Result Value Ref Range    BSA 1.54 m2    TDI SEPTAL 0.06 m/s    LV LATERAL E/E' RATIO 18.13 m/s    LV SEPTAL E/E' RATIO 24.17 m/s    IVC diameter 1.72 cm    Left Ventricular Outflow Tract Mean Velocity 0.87 cm/s    Left Ventricular  Outflow Tract Mean Gradient 3.24 mmHg    TDI LATERAL 0.08 m/s    LVIDd 3.88 3.5 - 6.0 cm    IVS 1.37 (A) 0.6 - 1.1 cm    Posterior Wall 1.53 (A) 0.6 - 1.1 cm    Ao root annulus 2.62 cm    LVIDs 2.64 2.1 - 4.0 cm    FS 32 28 - 44 %    STJ 2.65 cm    Ascending aorta 3.76 cm    LV mass 211.31 g    LA size 3.34 cm    RVDD 2.93 cm    RV S' 0.01 cm/s    Left Ventricle Relative Wall Thickness 0.79 cm    AV regurgitation pressure 1/2 time [id number] ms    AV mean gradient 47 mmHg    AV valve area 0.79 cm2    AV Velocity Ratio 0.26     AV index (prosthetic) 0.25     E/A ratio 0.75     Mean e' 0.07 m/s    E wave deceleration time 180.27 msec    IVRT 45.67 msec    LVOT diameter 2.01 cm    LVOT area 3.2 cm2    LVOT peak peña 1.12 m/s    LVOT peak VTI 30.90 cm    Ao peak peña 4.37 m/s    Ao .5 cm    RVOT peak peña 0.83 m/s    RVOT peak VTI 21.9 cm    Mr max peña 5.96 m/s    LVOT stroke volume 98.00 cm3    AV peak gradient 76 mmHg    PV mean gradient 1.48 mmHg    E/E' ratio 20.71 m/s    MV Peak E Peña 1.45 m/s    AR Max Peña 4.20 m/s    TR Max Peña 3.23 m/s    MV Peak A Peña 1.93 m/s    LV Systolic Volume 25.54 mL    LV Systolic Volume Index 16.3 mL/m2    LV Diastolic Volume 65.22 mL    LV Diastolic Volume Index 41.54 mL/m2    LV Mass Index 135 g/m2    RA Major Axis 5.08 cm    Left Atrium Minor Axis 5.12 cm    Left Atrium Major Axis 5.14 cm    Triscuspid Valve Regurgitation Peak Gradient 42 mmHg    LA Volume Index (Mod) 31.6 mL/m2    LA volume (mod) 49.57 cm3    LA WIDTH 4.50 cm    LA volume 65.54 cm3    TAPSE 2.10 cm    LA Volume Index 41.7 mL/m2    RA Width 3.00 cm    Right Atrial Pressure (from IVC) 3 mmHg    EF 60 %    TV rest pulmonary artery pressure 45 mmHg    Narrative    · The left ventricle is normal in size with concentric hypertrophy and   normal systolic function.  · Moderate left atrial enlargement.  · The estimated ejection fraction is 60%.  · Grade I left ventricular diastolic dysfunction.  · Normal right  ventricular size with normal right ventricular systolic   function.  · Mild right atrial enlargement.  · Moderate aortic regurgitation.  · There is severe aortic valve stenosis.  · Aortic valve area is 0.79 cm2; peak velocity is 4.37 m/s; mean gradient   is 47 mmHg.  · Mild mitral regurgitation.  · Mild tricuspid regurgitation.  · Normal central venous pressure (3 mmHg).  · The estimated PA systolic pressure is 45 mmHg.  · There is pulmonary hypertension.      , EKG: Reviewed, and X-Ray: CXR: X-Ray Chest 1 View (CXR): No results found for this visit on 11/04/22. and X-Ray Chest PA and Lateral (CXR): No results found for this visit on 11/04/22.    Assessment and Plan:     Brief HPI: stable CV status, plan for flex sig per GI     * GI bleed  -+FOBT   -GI on board    - elevated CV risk for invasive procedures requiring anesthesia sec to severe AS     Elevated troponin  Sec to demand ischemia with GIB  No plans for ischemic workup due to age, anemia, dementia     Aortic stenosis  Severe AS - not candidate for TAVR or AVR    History of stroke  -Unable to give any antiplatelet or AC at present time given GI bleed/anemia    New onset a-fib  -EKG's reviewed, lots of underlying artifact/noise, appears to show SR with PAC's  -Repeat pending  -Regardless, patient is not a candidate for AC at present time given GIB/anemia issues  -Echo pending    11/5/22   - repeat ECG c/w NSR with PACs; ECHO with severe AS;     Iron deficiency anemia due to chronic blood loss  -H/H improved post transfusion  -+FOBT   -GI on board          No plans for invasive workup per GI - cont med management     VTE Risk Mitigation (From admission, onward)           Ordered     IP VTE HIGH RISK PATIENT  Once         11/04/22 0542     Place sequential compression device  Until discontinued         11/04/22 0542     Reason for No Pharmacological VTE Prophylaxis  Once        Question:  Reasons:  Answer:  Active Bleeding    11/04/22 0542                     Jean-Paul Al Md, MD  Cardiology  [facility]

## 2022-11-05 NOTE — HOSPITAL COURSE
"HPI obtained from chart and with assistance of family member at bedside     Ms. Dunlap is a 89 year old female patient whose current medical conditions include dementia, prior CVA with no residual deficits, AS, HTN, and hyperlipidemia who initially presented to [facility] ED with generalized weakness, increased fatigue, and frequent stool. There was no report of any associated fever, chills, james chest pain, SOB, palpitations, near syncope, or syncope. Workup there revealed anemia, +FOBT, and questionable new onset afib noted on EKG. Patient was subsequently transferred to [facility] for GI evaluation. Cardiology consulted to assist with management. Patient seen and examined today, resting in bed. Feels ok, states she has to "use the bathroom". Denies any real CV symptoms-no chest pain. No prior history of CAD or MI. Family reported prior diagnosis of anemia in 6/22 and patient was advised to hold ASA and conservative mgmt was recommended at that time.     Chart reviewed. H/H improved to 9.3/28.7 post transfusion. Echo pending. EKG's reviewed, poor quality with underlying noise, appear to show SR with PAC's. Will try to obtain repeat/better quality.     Results for orders placed during the hospital encounter of 11/04/22    Echo    Interpretation Summary  · The left ventricle is normal in size with concentric hypertrophy and normal systolic function.  · Moderate left atrial enlargement.  · The estimated ejection fraction is 60%.  · Grade I left ventricular diastolic dysfunction.  · Normal right ventricular size with normal right ventricular systolic function.  · Mild right atrial enlargement.  · Moderate aortic regurgitation.  · There is severe aortic valve stenosis.  · Aortic valve area is 0.79 cm2; peak velocity is 4.37 m/s; mean gradient is 47 mmHg.  · Mild mitral regurgitation.  · Mild tricuspid regurgitation.  · Normal central venous pressure (3 mmHg).  · The estimated PA systolic pressure is 45 " mmHg.  · There is pulmonary hypertension.      11/5/22 - ECHO with severe AS; discussed high risk for any invasive procedures; will have Flex sig per GI to eval bleeding; Hgb stable 8.3; hemodyn stable     11/6/22 - stable CV status, remain in rate controlled Afib, discussed with daughter pt is high risk for CVA, rec Eliquis needs to f/u with primary cardiologist and start A/C once rectal bleeding/hemorrhoids resolve

## 2022-11-05 NOTE — PROGRESS NOTES
[facility]  Gastroenterology  Progress Note    Patient Name: Syeda Dunlap  MRN: [id number]  Admission Date: 11/4/2022  Hospital Length of Stay: 1 days  Code Status: Full Code   Attending Provider: Ray Hyman MD  Consulting Provider: Sarah Barfield MD  Primary Care Physician: Montse Arciniega NP  Principal Problem: GI bleed      Subjective:     Interval History: no events overnight.  Started on antibiotics for UTI and stool softeners    Review of Systems   All other systems reviewed and are negative.  Objective:     Vital Signs (Most Recent):  Temp: 97 °F (36.1 °C) (11/05/22 0908)  Pulse: 62 (11/05/22 0909)  Resp: 18 (11/05/22 0908)  BP: (!) 146/64 (11/05/22 0908)  SpO2: 98 % (11/05/22 0908)   Vital Signs (24h Range):  Temp:  [97 °F (36.1 °C)-98.2 °F (36.8 °C)] 97 °F (36.1 °C)  Pulse:  [52-91] 62  Resp:  [18] 18  SpO2:  [98 %-99 %] 98 %  BP: (144-147)/(62-69) 146/64     Weight: 51.3 kg (113 lb 1.5 oz) (11/04/22 1815)  Body mass index is 18.25 kg/m².      Intake/Output Summary (Last 24 hours) at 11/5/2022 1132  Last data filed at 11/5/2022 0854  Gross per 24 hour   Intake 360 ml   Output --   Net 360 ml       Lines/Drains/Airways       Peripheral Intravenous Line  Duration                  Peripheral IV - Single Lumen 11/04/22 0000 20 G Anterior;Left;Proximal Forearm 1 day                    Physical Exam  Constitutional:       Appearance: Normal appearance.   HENT:      Head: Normocephalic and atraumatic.   Eyes:      General: No scleral icterus.     Extraocular Movements: Extraocular movements intact.   Cardiovascular:      Pulses: Normal pulses.   Pulmonary:      Breath sounds: Normal breath sounds. No wheezing.   Abdominal:      General: There is no distension.      Tenderness: There is abdominal tenderness.   Musculoskeletal:         General: No swelling or tenderness.   Skin:     General: Skin is warm and dry.   Neurological:      Mental Status: She is alert.       Significant Labs:  CBC:    Recent Labs   Lab 11/03/22  1732 11/04/22  0547 11/05/22  0557   WBC 6.8 8.62 7.81   HGB 7.6* 9.3* 8.3*   HCT 23.7* 28.7* 26.4*    244 200     CMP:   Recent Labs   Lab 11/05/22  0557   GLU 60*   CALCIUM 8.1*   ALBUMIN 1.7*   PROT 4.8*      K 3.1*   CO2 24      BUN 13   CREATININE 0.8   ALKPHOS 92   ALT 30   AST 43*   BILITOT 0.7         Significant Imaging:  Imaging results within the past 24 hours have been reviewed.    Assessment/Plan:     New onset a-fib  Rate controlled. Per cardiology     Iron deficiency anemia due to chronic blood loss  Patient with acute on chronic anemia. Good response to 1u PRBC.  Likely multifactorial   CT showing possible proctocolitis and moderate fecal material, likely from rectal stool burden/sterocal ulcer.    - no plans for endoscopy given pt is high risk and hemoglobin has remained relatively stable  - agree with good bowel regimen to include enemas if needed  - can do short course of hydrocortisone suppositories for findings on CT scan  - plan discussed with family at bedside and hospital medicine        Thank you for your consult. I will follow-up with patient. Please contact us if you have any additional questions.    Sarah Barfield MD  Gastroenterology  [facility]

## 2022-11-05 NOTE — SUBJECTIVE & OBJECTIVE
Interval History:   GI aborted plan for endoscopy given pt deemed high risk per Cardiology  with h/o severe aortic stenosis      Review of Systems   Unable to perform ROS: Other   Constitutional:  Positive for activity change and fatigue. Negative for appetite change and fever.   HENT:  Negative for sore throat.    Eyes:  Negative for visual disturbance.   Respiratory:  Negative for cough, chest tightness and shortness of breath.    Cardiovascular:  Negative for chest pain, palpitations and leg swelling.   Gastrointestinal:  Negative for abdominal distention, abdominal pain, blood in stool, constipation, diarrhea, nausea and vomiting.        Loose stool   Endocrine: Negative for polyuria.   Genitourinary:  Positive for frequency. Negative for decreased urine volume, dysuria, flank pain and hematuria.   Musculoskeletal:  Negative for back pain and gait problem.   Skin:  Negative for rash.   Neurological:  Negative for syncope, speech difficulty, weakness, light-headedness and headaches.        Dementia    Psychiatric/Behavioral:  Negative for confusion, hallucinations and sleep disturbance.    Objective:     Vital Signs (Most Recent):  Temp: 98 °F (36.7 °C) (11/05/22 1200)  Pulse: 66 (11/05/22 1200)  Resp: 17 (11/05/22 1200)  BP: 131/63 (11/05/22 1200)  SpO2: 99 % (11/05/22 1200) Vital Signs (24h Range):  Temp:  [97 °F (36.1 °C)-98.2 °F (36.8 °C)] 98 °F (36.7 °C)  Pulse:  [52-91] 66  Resp:  [17-18] 17  SpO2:  [98 %-99 %] 99 %  BP: (131-147)/(62-69) 131/63     Weight: 51.3 kg (113 lb 1.5 oz)  Body mass index is 18.25 kg/m².    Intake/Output Summary (Last 24 hours) at 11/5/2022 1413  Last data filed at 11/5/2022 1248  Gross per 24 hour   Intake 480 ml   Output --   Net 480 ml      Physical Exam  Vitals and nursing note reviewed.   Constitutional:       General: She is not in acute distress.     Appearance: She is well-developed. She is not diaphoretic.   HENT:      Head: Normocephalic and atraumatic.      Nose: Nose  normal.   Eyes:      General: No scleral icterus.     Conjunctiva/sclera: Conjunctivae normal.   Neck:      Trachea: No tracheal deviation.   Cardiovascular:      Rate and Rhythm: Normal rate and regular rhythm.      Heart sounds: Murmur heard.     No friction rub. No gallop.   Pulmonary:      Effort: Pulmonary effort is normal. No respiratory distress.      Breath sounds: Normal breath sounds. No stridor. No wheezing or rales.   Chest:      Chest wall: No tenderness.   Abdominal:      General: Bowel sounds are normal. There is no distension.      Palpations: Abdomen is soft. There is no mass.      Tenderness: There is no abdominal tenderness. There is no guarding or rebound.   Musculoskeletal:         General: No tenderness or deformity. Normal range of motion.      Cervical back: Normal range of motion and neck supple.      Right lower leg: No edema.      Left lower leg: No edema.   Skin:     General: Skin is warm and dry.      Coloration: Skin is pale.      Findings: No erythema or rash.   Neurological:      General: No focal deficit present.      Mental Status: She is alert. Mental status is at baseline.      Cranial Nerves: No cranial nerve deficit.      Motor: No abnormal muscle tone.      Coordination: Coordination normal.      Comments: Oriented to self, person and place   Psychiatric:         Behavior: Behavior normal.         Thought Content: Thought content normal.         Cognition and Memory: Cognition is impaired.       Significant Labs: All pertinent labs within the past 24 hours have been reviewed.  BMP:   Recent Labs   Lab 11/05/22 0557   GLU 60*      K 3.1*      CO2 24   BUN 13   CREATININE 0.8   CALCIUM 8.1*   MG 1.7     CBC:   Recent Labs   Lab 11/03/22  1732 11/04/22  0547 11/05/22  0557   WBC 6.8 8.62 7.81   HGB 7.6* 9.3* 8.3*   HCT 23.7* 28.7* 26.4*    244 200     CMP:   Recent Labs   Lab 11/03/22  1732 11/04/22  0547 11/05/22  0557    138 141   K 2.6* 2.8* 3.1*   CL   --  106 109   CO2 23 24 24   GLU  --  72 60*   BUN 16.3 14 13   CREATININE 1.01 0.8 0.8   CALCIUM 8.3* 8.6* 8.1*   PROT  --  5.8* 4.8*   ALBUMIN 1.6* 1.9* 1.7*   BILITOT 0.6 0.9 0.7   ALKPHOS 95 96 92   AST 37* 36 43*   ALT 23 24 30   ANIONGAP  --  8 8       Significant Imaging:

## 2022-11-06 VITALS
RESPIRATION RATE: 18 BRPM | BODY MASS INDEX: 17.07 KG/M2 | HEIGHT: 66 IN | DIASTOLIC BLOOD PRESSURE: 61 MMHG | WEIGHT: 106.25 LBS | HEART RATE: 82 BPM | OXYGEN SATURATION: 99 % | SYSTOLIC BLOOD PRESSURE: 144 MMHG | TEMPERATURE: 98 F

## 2022-11-06 LAB
ALBUMIN SERPL BCP-MCNC: 1.9 G/DL (ref 3.5–5.2)
ALP SERPL-CCNC: 102 U/L (ref 55–135)
ALT SERPL W/O P-5'-P-CCNC: 35 U/L (ref 10–44)
ANION GAP SERPL CALC-SCNC: 10 MMOL/L (ref 8–16)
AST SERPL-CCNC: 50 U/L (ref 10–40)
BASOPHILS # BLD AUTO: 0.03 K/UL (ref 0–0.2)
BASOPHILS NFR BLD: 0.3 % (ref 0–1.9)
BILIRUB SERPL-MCNC: 0.6 MG/DL (ref 0.1–1)
BUN SERPL-MCNC: 14 MG/DL (ref 10–30)
CALCIUM SERPL-MCNC: 8.2 MG/DL (ref 8.7–10.5)
CHLORIDE SERPL-SCNC: 105 MMOL/L (ref 95–110)
CO2 SERPL-SCNC: 25 MMOL/L (ref 23–29)
CREAT SERPL-MCNC: 0.9 MG/DL (ref 0.5–1.4)
DIFFERENTIAL METHOD: ABNORMAL
EOSINOPHIL # BLD AUTO: 0 K/UL (ref 0–0.5)
EOSINOPHIL NFR BLD: 0.1 % (ref 0–8)
ERYTHROCYTE [DISTWIDTH] IN BLOOD BY AUTOMATED COUNT: 16.2 % (ref 11.5–14.5)
EST. GFR  (NO RACE VARIABLE): 59 ML/MIN/1.73 M^2
GLUCOSE SERPL-MCNC: 106 MG/DL (ref 70–110)
HCT VFR BLD AUTO: 29.2 % (ref 37–48.5)
HGB BLD-MCNC: 9.3 G/DL (ref 12–16)
IMM GRANULOCYTES # BLD AUTO: 0.05 K/UL (ref 0–0.04)
IMM GRANULOCYTES NFR BLD AUTO: 0.5 % (ref 0–0.5)
LYMPHOCYTES # BLD AUTO: 0.9 K/UL (ref 1–4.8)
LYMPHOCYTES NFR BLD: 8.2 % (ref 18–48)
MAGNESIUM SERPL-MCNC: 1.7 MG/DL (ref 1.6–2.6)
MCH RBC QN AUTO: 27.8 PG (ref 27–31)
MCHC RBC AUTO-ENTMCNC: 31.8 G/DL (ref 32–36)
MCV RBC AUTO: 87 FL (ref 82–98)
MONOCYTES # BLD AUTO: 0.6 K/UL (ref 0.3–1)
MONOCYTES NFR BLD: 5.2 % (ref 4–15)
NEUTROPHILS # BLD AUTO: 9.5 K/UL (ref 1.8–7.7)
NEUTROPHILS NFR BLD: 85.7 % (ref 38–73)
NRBC BLD-RTO: 0 /100 WBC
PHOSPHATE SERPL-MCNC: 2.5 MG/DL (ref 2.7–4.5)
PLATELET # BLD AUTO: 224 K/UL (ref 150–450)
PMV BLD AUTO: 9.9 FL (ref 9.2–12.9)
POTASSIUM SERPL-SCNC: 3.1 MMOL/L (ref 3.5–5.1)
PROT SERPL-MCNC: 5.3 G/DL (ref 6–8.4)
RBC # BLD AUTO: 3.34 M/UL (ref 4–5.4)
SODIUM SERPL-SCNC: 140 MMOL/L (ref 136–145)
WBC # BLD AUTO: 11.08 K/UL (ref 3.9–12.7)

## 2022-11-06 PROCEDURE — 97530 THERAPEUTIC ACTIVITIES: CPT | Mod: CQ

## 2022-11-06 PROCEDURE — 63600175 PHARM REV CODE 636 W HCPCS: Performed by: INTERNAL MEDICINE

## 2022-11-06 PROCEDURE — 99233 SBSQ HOSP IP/OBS HIGH 50: CPT | Mod: ,,, | Performed by: INTERNAL MEDICINE

## 2022-11-06 PROCEDURE — 99233 PR SUBSEQUENT HOSPITAL CARE,LEVL III: ICD-10-PCS | Mod: ,,, | Performed by: INTERNAL MEDICINE

## 2022-11-06 PROCEDURE — 36415 COLL VENOUS BLD VENIPUNCTURE: CPT | Performed by: NURSE PRACTITIONER

## 2022-11-06 PROCEDURE — 25000003 PHARM REV CODE 250: Performed by: INTERNAL MEDICINE

## 2022-11-06 PROCEDURE — 85025 COMPLETE CBC W/AUTO DIFF WBC: CPT | Performed by: NURSE PRACTITIONER

## 2022-11-06 PROCEDURE — 25000003 PHARM REV CODE 250: Performed by: NURSE PRACTITIONER

## 2022-11-06 PROCEDURE — 84100 ASSAY OF PHOSPHORUS: CPT | Performed by: NURSE PRACTITIONER

## 2022-11-06 PROCEDURE — 80053 COMPREHEN METABOLIC PANEL: CPT | Performed by: NURSE PRACTITIONER

## 2022-11-06 PROCEDURE — 83735 ASSAY OF MAGNESIUM: CPT | Performed by: NURSE PRACTITIONER

## 2022-11-06 RX ORDER — METRONIDAZOLE 500 MG/1
500 TABLET ORAL EVERY 8 HOURS
Qty: 9 TABLET | Refills: 0 | Status: SHIPPED | OUTPATIENT
Start: 2022-11-06 | End: 2022-11-09

## 2022-11-06 RX ORDER — FUROSEMIDE 20 MG/1
20 TABLET ORAL DAILY
Status: DISCONTINUED | OUTPATIENT
Start: 2022-11-06 | End: 2022-11-06 | Stop reason: HOSPADM

## 2022-11-06 RX ORDER — PANTOPRAZOLE SODIUM 40 MG/1
40 TABLET, DELAYED RELEASE ORAL DAILY
Qty: 30 TABLET | Refills: 0 | Status: SHIPPED | OUTPATIENT
Start: 2022-11-06 | End: 2022-12-06

## 2022-11-06 RX ORDER — CEFUROXIME AXETIL 250 MG/1
500 TABLET ORAL EVERY 12 HOURS
Qty: 12 TABLET | Refills: 0 | Status: SHIPPED | OUTPATIENT
Start: 2022-11-06 | End: 2022-11-09

## 2022-11-06 RX ORDER — POTASSIUM CHLORIDE 20 MEQ/1
20 TABLET, EXTENDED RELEASE ORAL DAILY
Qty: 30 TABLET | Refills: 0 | Status: SHIPPED | OUTPATIENT
Start: 2022-11-06 | End: 2022-12-06

## 2022-11-06 RX ORDER — METOPROLOL SUCCINATE 25 MG/1
12.5 TABLET, EXTENDED RELEASE ORAL DAILY
Qty: 15 TABLET | Refills: 0 | Status: SHIPPED | OUTPATIENT
Start: 2022-11-07 | End: 2022-12-07

## 2022-11-06 RX ORDER — FUROSEMIDE 20 MG/1
20 TABLET ORAL DAILY
Qty: 30 TABLET | Refills: 0 | Status: SHIPPED | OUTPATIENT
Start: 2022-11-06 | End: 2022-12-06

## 2022-11-06 RX ORDER — POLYETHYLENE GLYCOL 3350 17 G/17G
17 POWDER, FOR SOLUTION ORAL DAILY
Qty: 30 EACH | Refills: 0 | Status: SHIPPED | OUTPATIENT
Start: 2022-11-07 | End: 2022-12-07

## 2022-11-06 RX ORDER — DOCUSATE SODIUM 100 MG/1
100 CAPSULE, LIQUID FILLED ORAL 2 TIMES DAILY
Refills: 0 | COMMUNITY
Start: 2022-11-06

## 2022-11-06 RX ADMIN — Medication 1 TABLET: at 08:11

## 2022-11-06 RX ADMIN — DOCUSATE SODIUM 100 MG: 100 CAPSULE, LIQUID FILLED ORAL at 08:11

## 2022-11-06 RX ADMIN — ATORVASTATIN CALCIUM 40 MG: 40 TABLET, FILM COATED ORAL at 08:11

## 2022-11-06 RX ADMIN — CEFTRIAXONE 1 G: 1 INJECTION, SOLUTION INTRAVENOUS at 11:11

## 2022-11-06 RX ADMIN — METRONIDAZOLE 500 MG: 500 TABLET ORAL at 01:11

## 2022-11-06 RX ADMIN — METOPROLOL SUCCINATE 12.5 MG: 25 TABLET, EXTENDED RELEASE ORAL at 08:11

## 2022-11-06 RX ADMIN — HYDROCORTISONE: 25 CREAM TOPICAL at 08:11

## 2022-11-06 RX ADMIN — IRON SUCROSE 200 MG: 20 INJECTION, SOLUTION INTRAVENOUS at 08:11

## 2022-11-06 RX ADMIN — FUROSEMIDE 20 MG: 20 TABLET ORAL at 11:11

## 2022-11-06 RX ADMIN — PANTOPRAZOLE SODIUM 40 MG: 40 TABLET, DELAYED RELEASE ORAL at 08:11

## 2022-11-06 RX ADMIN — METRONIDAZOLE 500 MG: 500 TABLET ORAL at 06:11

## 2022-11-06 RX ADMIN — POTASSIUM CHLORIDE 40 MEQ: 1500 TABLET, EXTENDED RELEASE ORAL at 08:11

## 2022-11-06 RX ADMIN — POLYETHYLENE GLYCOL 3350 17 G: 17 POWDER, FOR SOLUTION ORAL at 08:11

## 2022-11-06 NOTE — ASSESSMENT & PLAN NOTE
-EKG's reviewed, lots of underlying artifact/noise, appears to show SR with PAC's  -Repeat pending  -Regardless, patient is not a candidate for AC at present time given GIB/anemia issues  -Echo pending    11/5/22   - repeat ECG c/w NSR with PACs; ECHO with severe AS;     11/6/22 - ECG with Afib, rate controlled;  discussed with daughter pt is high risk for CVA, rec Eliquis needs to f/u with primary cardiologist and start A/C once rectal bleeding/hemorrhoids resolve

## 2022-11-06 NOTE — CONSULTS
PIOTR spoke with patient's daughter in law Rhona to get a choice to home health. Who reported that patient uses [facility].  PIOTR explained that [facility] is similar to patient going to the doctors office.  The only difference is that they office goes to the patient.  SW explained what HH is and what it consist of. Patient's daughter in law called patient significant other to discuss preference of HH agencies.    PIOTR sent the referral to [facility].     Liane Watkins LMSW 11/6/2022 9:01 AM

## 2022-11-06 NOTE — PROGRESS NOTES
"[facility]  Cardiology  Progress Note    Patient Name: Syeda Dunlap  MRN: [id number]  Admission Date: 11/4/2022  Hospital Length of Stay: 2 days  Code Status: Prior   Attending Physician: No att. providers found   Primary Care Physician: Montse Arciniega NP  Expected Discharge Date: 11/6/2022  Principal Problem:GI bleed    Subjective:     Hospital Course:   HPI obtained from chart and with assistance of family member at bedside     Ms. Dunlap is a 89 year old female patient whose current medical conditions include dementia, prior CVA with no residual deficits, AS, HTN, and hyperlipidemia who initially presented to [facility] ED with generalized weakness, increased fatigue, and frequent stool. There was no report of any associated fever, chills, james chest pain, SOB, palpitations, near syncope, or syncope. Workup there revealed anemia, +FOBT, and questionable new onset afib noted on EKG. Patient was subsequently transferred to [facility] for GI evaluation. Cardiology consulted to assist with management. Patient seen and examined today, resting in bed. Feels ok, states she has to "use the bathroom". Denies any real CV symptoms-no chest pain. No prior history of CAD or MI. Family reported prior diagnosis of anemia in 6/22 and patient was advised to hold ASA and conservative mgmt was recommended at that time.     Chart reviewed. H/H improved to 9.3/28.7 post transfusion. Echo pending. EKG's reviewed, poor quality with underlying noise, appear to show SR with PAC's. Will try to obtain repeat/better quality.     Results for orders placed during the hospital encounter of 11/04/22    Echo    Interpretation Summary  · The left ventricle is normal in size with concentric hypertrophy and normal systolic function.  · Moderate left atrial enlargement.  · The estimated ejection fraction is 60%.  · Grade I left ventricular diastolic dysfunction.  · Normal right ventricular size with normal right " ventricular systolic function.  · Mild right atrial enlargement.  · Moderate aortic regurgitation.  · There is severe aortic valve stenosis.  · Aortic valve area is 0.79 cm2; peak velocity is 4.37 m/s; mean gradient is 47 mmHg.  · Mild mitral regurgitation.  · Mild tricuspid regurgitation.  · Normal central venous pressure (3 mmHg).  · The estimated PA systolic pressure is 45 mmHg.  · There is pulmonary hypertension.      11/5/22 - ECHO with severe AS; discussed high risk for any invasive procedures; will have Flex sig per GI to eval bleeding; Hgb stable 8.3; hemodyn stable     11/6/22 - stable CV status, remain in rate controlled Afib, discussed with daughter pt is high risk for CVA, rec Eliquis needs to f/u with primary cardiologist and start A/C once rectal bleeding/hemorrhoids resolve       Past Medical History:   Diagnosis Date    Anemia, unspecified     Aortic stenosis     CVA (cerebral vascular accident)     Essential (primary) hypertension     Mixed hyperlipidemia        Past Surgical History:   Procedure Laterality Date    APPENDECTOMY      CATARACT EXTRACTION      CHOLECYSTECTOMY      HYSTERECTOMY         Review of patient's allergies indicates:   Allergen Reactions    Sulfa (sulfonamide antibiotics)      Questionable in record       No current facility-administered medications on file prior to encounter.     Current Outpatient Medications on File Prior to Encounter   Medication Sig    atorvastatin (LIPITOR) 40 MG tablet Take 1 tablet by mouth once daily.    cranberry fruit concentrate 250 mg Chew Take by mouth.    multivit/folic acid/vit K1 (ONE-A-DAY WOMEN'S 50 PLUS ORAL) Take 1 tablet by mouth once daily.    [DISCONTINUED] amLODIPine (NORVASC) 2.5 MG tablet Take 2.5 mg by mouth once daily.    [DISCONTINUED] aspirin (ECOTRIN) 81 MG EC tablet Take 81 mg by mouth.    [DISCONTINUED] olmesartan (BENICAR) 40 MG tablet Take 40 mg by mouth once daily.     Family History       Problem Relation  (Age of Onset)    Alzheimer's disease Mother    Heart attack Father          Tobacco Use    Smoking status: Never    Smokeless tobacco: Not on file   Substance and Sexual Activity    Alcohol use: Never    Drug use: Not on file    Sexual activity: Not on file     Review of Systems   Unable to perform ROS: Dementia   Constitutional: Positive for malaise/fatigue.   Hematologic/Lymphatic: Bruises/bleeds easily.   Gastrointestinal:         Frequent stools   Psychiatric/Behavioral:  Positive for memory loss.    Allergic/Immunologic: Negative.    Objective:     Vital Signs (Most Recent):  Temp: 98 °F (36.7 °C) (11/06/22 0854)  Pulse: 82 (11/06/22 0854)  Resp: 18 (11/06/22 0854)  BP: (!) 144/61 (11/06/22 0854)  SpO2: 99 % (11/06/22 0854) Vital Signs (24h Range):  Temp:  [97.7 °F (36.5 °C)-98.5 °F (36.9 °C)] 98 °F (36.7 °C)  Pulse:  [51-82] 82  Resp:  [16-18] 18  SpO2:  [97 %-99 %] 99 %  BP: (133-163)/(61-78) 144/61     Weight: 48.2 kg (106 lb 4.2 oz)  Body mass index is 17.15 kg/m².    SpO2: 99 %  O2 Device (Oxygen Therapy): room air      Intake/Output Summary (Last 24 hours) at 11/6/2022 1708  Last data filed at 11/6/2022 1340  Gross per 24 hour   Intake 487.83 ml   Output --   Net 487.83 ml         Lines/Drains/Airways       None                   Physical Exam  Vitals and nursing note reviewed.   Constitutional:       General: She is not in acute distress.     Appearance: She is well-developed. She is not diaphoretic.      Comments: Elderly, frail appearing   HENT:      Head: Normocephalic and atraumatic.   Eyes:      General:         Right eye: No discharge.         Left eye: No discharge.      Pupils: Pupils are equal, round, and reactive to light.   Neck:      Thyroid: No thyromegaly.      Vascular: No JVD.      Trachea: No tracheal deviation.   Cardiovascular:      Rate and Rhythm: Normal rate and regular rhythm. Extrasystoles are present.     Heart sounds: S1 normal and S2 normal. Murmur heard.   Harsh  midsystolic murmur is present at the upper right sternal border radiating to the neck.   Pulmonary:      Effort: Pulmonary effort is normal. No respiratory distress.      Breath sounds: Normal breath sounds. No wheezing or rales.   Abdominal:      General: There is no distension.      Palpations: Abdomen is soft.      Tenderness: There is no rebound.   Musculoskeletal:      Cervical back: Neck supple.      Right lower leg: No edema.      Left lower leg: No edema.   Skin:     General: Skin is warm and dry.      Coloration: Skin is pale.      Findings: No erythema.   Neurological:      General: No focal deficit present.      Mental Status: She is alert and oriented to person, place, and time.   Psychiatric:         Mood and Affect: Mood normal.         Behavior: Behavior normal.         Thought Content: Thought content normal.       Significant Labs: CMP   Recent Labs   Lab 11/05/22  0557 11/06/22  0552    140   K 3.1* 3.1*    105   CO2 24 25   GLU 60* 106   BUN 13 14   CREATININE 0.8 0.9   CALCIUM 8.1* 8.2*   PROT 4.8* 5.3*   ALBUMIN 1.7* 1.9*   BILITOT 0.7 0.6   ALKPHOS 92 102   AST 43* 50*   ALT 30 35   ANIONGAP 8 10     , CBC   Recent Labs   Lab 11/05/22  0557 11/06/22  0552   WBC 7.81 11.08   HGB 8.3* 9.3*   HCT 26.4* 29.2*    224     , INR No results for input(s): INR, PROTIME in the last 48 hours., Troponin   No results for input(s): TROPONINI in the last 48 hours.  , and All pertinent lab results from the last 24 hours have been reviewed.    Significant Imaging: Echocardiogram: Transthoracic echo (TTE) complete (Cupid Only):   Results for orders placed or performed during the hospital encounter of 11/04/22   Echo   Result Value Ref Range    BSA 1.54 m2    TDI SEPTAL 0.06 m/s    LV LATERAL E/E' RATIO 18.13 m/s    LV SEPTAL E/E' RATIO 24.17 m/s    IVC diameter 1.72 cm    Left Ventricular Outflow Tract Mean Velocity 0.87 cm/s    Left Ventricular Outflow Tract Mean Gradient 3.24 mmHg    TDI  LATERAL 0.08 m/s    LVIDd 3.88 3.5 - 6.0 cm    IVS 1.37 (A) 0.6 - 1.1 cm    Posterior Wall 1.53 (A) 0.6 - 1.1 cm    Ao root annulus 2.62 cm    LVIDs 2.64 2.1 - 4.0 cm    FS 32 28 - 44 %    STJ 2.65 cm    Ascending aorta 3.76 cm    LV mass 211.31 g    LA size 3.34 cm    RVDD 2.93 cm    RV S' 0.01 cm/s    Left Ventricle Relative Wall Thickness 0.79 cm    AV regurgitation pressure 1/2 time 453.95928492245324 ms    AV mean gradient 47 mmHg    AV valve area 0.79 cm2    AV Velocity Ratio 0.26     AV index (prosthetic) 0.25     E/A ratio 0.75     Mean e' 0.07 m/s    E wave deceleration time 180.27 msec    IVRT 45.67 msec    LVOT diameter 2.01 cm    LVOT area 3.2 cm2    LVOT peak peña 1.12 m/s    LVOT peak VTI 30.90 cm    Ao peak peña 4.37 m/s    Ao .5 cm    RVOT peak peña 0.83 m/s    RVOT peak VTI 21.9 cm    Mr max peña 5.96 m/s    LVOT stroke volume 98.00 cm3    AV peak gradient 76 mmHg    PV mean gradient 1.48 mmHg    E/E' ratio 20.71 m/s    MV Peak E Peña 1.45 m/s    AR Max Peña 4.20 m/s    TR Max Peña 3.23 m/s    MV Peak A Peña 1.93 m/s    LV Systolic Volume 25.54 mL    LV Systolic Volume Index 16.3 mL/m2    LV Diastolic Volume 65.22 mL    LV Diastolic Volume Index 41.54 mL/m2    LV Mass Index 135 g/m2    RA Major Axis 5.08 cm    Left Atrium Minor Axis 5.12 cm    Left Atrium Major Axis 5.14 cm    Triscuspid Valve Regurgitation Peak Gradient 42 mmHg    LA Volume Index (Mod) 31.6 mL/m2    LA volume (mod) 49.57 cm3    LA WIDTH 4.50 cm    LA volume 65.54 cm3    TAPSE 2.10 cm    LA Volume Index 41.7 mL/m2    RA Width 3.00 cm    Right Atrial Pressure (from IVC) 3 mmHg    EF 60 %    TV rest pulmonary artery pressure 45 mmHg    Narrative    · The left ventricle is normal in size with concentric hypertrophy and   normal systolic function.  · Moderate left atrial enlargement.  · The estimated ejection fraction is 60%.  · Grade I left ventricular diastolic dysfunction.  · Normal right ventricular size with normal right ventricular  systolic   function.  · Mild right atrial enlargement.  · Moderate aortic regurgitation.  · There is severe aortic valve stenosis.  · Aortic valve area is 0.79 cm2; peak velocity is 4.37 m/s; mean gradient   is 47 mmHg.  · Mild mitral regurgitation.  · Mild tricuspid regurgitation.  · Normal central venous pressure (3 mmHg).  · The estimated PA systolic pressure is 45 mmHg.  · There is pulmonary hypertension.        , EKG: Reviewed, and X-Ray: CXR: X-Ray Chest 1 View (CXR): No results found for this visit on 11/04/22. and X-Ray Chest PA and Lateral (CXR): No results found for this visit on 11/04/22.    Assessment and Plan:     Brief HPI: stable CV status, f/u outpt CV and start Eliquis once rectal bleeding/hemorroids resolve    * GI bleed  -+FOBT   -GI on board    - elevated CV risk for invasive procedures requiring anesthesia sec to severe AS     Elevated troponin  Sec to demand ischemia with GIB  No plans for ischemic workup due to age, anemia, dementia     Aortic stenosis  Severe AS - not candidate for TAVR or AVR    History of stroke  -Unable to give any antiplatelet or AC at present time given GI bleed/anemia    New onset a-fib  -EKG's reviewed, lots of underlying artifact/noise, appears to show SR with PAC's  -Repeat pending  -Regardless, patient is not a candidate for AC at present time given GIB/anemia issues  -Echo pending    11/5/22   - repeat ECG c/w NSR with PACs; ECHO with severe AS;     11/6/22 - ECG with Afib, rate controlled;  discussed with daughter pt is high risk for CVA, rec Eliquis needs to f/u with primary cardiologist and start A/C once rectal bleeding/hemorrhoids resolve     Iron deficiency anemia due to chronic blood loss  -H/H improved post transfusion  -+FOBT   -GI on board          VTE Risk Mitigation (From admission, onward)         Ordered     IP VTE HIGH RISK PATIENT  Once         11/04/22 0542                Jean-Paul Al Md, MD  Cardiology  [facility]

## 2022-11-06 NOTE — PT/OT/SLP PROGRESS
"Physical Therapy Treatment    Patient Name:  Syeda Dunlap   MRN:  [id number]    Recommendations:     Discharge Recommendations:  nursing facility, skilled   Discharge Equipment Recommendations:  (TBD)   Barriers to discharge: None    Assessment:     Syeda Dunlap is a 89 y.o. female admitted with a medical diagnosis of GI bleed.  She presents with the following impairments/functional limitations:  weakness, impaired endurance, impaired functional mobility, impaired self care skills, impaired balance, gait instability, decreased lower extremity function, decreased safety awareness, impaired cardiopulmonary response to activity, decreased coordination.    Rehab Prognosis: Good; patient would benefit from acute skilled PT services to address these deficits and reach maximum level of function.    Recent Surgery: * No surgery found *      Plan:     During this hospitalization, patient to be seen 3 x/week to address the identified rehab impairments via gait training, therapeutic activities, therapeutic exercises, neuromuscular re-education and progress toward the following goals:    Plan of Care Expires:  11/18/22    Subjective     Chief Complaint: "I feel funny." (Pt stated while ambulating from restroom)  Patient/Family Comments/goals: to d/c home  Pain/Comfort:  Pain Rating 1: 0/10  Pain Rating Post-Intervention 1: 0/10      Objective:     Communicated with pt's nurseErika prior to session.  Patient found HOB elevated with peripheral IV upon PT entry to room.     General Precautions: Standard, fall   Orthopedic Precautions:N/A   Braces: N/A  Respiratory Status: Room air     Functional Mobility:  Bed Mobility:     Supine to Sit: minimum assistance  Transfers:     Sit to Stand:  contact guard assistance and minimum assistance with rolling walker and grab bars  1 trial from EOB: CGA no AD  2nd trial from toilet: min (A) with RW and grab bar  Toilet Transfer: minimum assistance with  rolling walker  using  Step " Transfer  Gait: Pt ambulates 10 ft x 2 to the restroom and back to the bedside chair. Gait distance limited due to pt with c/o incontinence (given diuretic and laxative).  Balance: Fair +      AM-PAC 6 CLICK MOBILITY  Turning over in bed (including adjusting bedclothes, sheets and blankets)?: 3  Sitting down on and standing up from a chair with arms (e.g., wheelchair, bedside commode, etc.): 3  Moving from lying on back to sitting on the side of the bed?: 3  Moving to and from a bed to a chair (including a wheelchair)?: 3  Need to walk in hospital room?: 3  Climbing 3-5 steps with a railing?: 1  Basic Mobility Total Score: 16       Treatment & Education:  Pt cued for technique during STS transfer in restroom.    Pt assisted to toilet for positive void and BM. Pt requires between set-up and minimum assistance for pericare in sitting/ standing.    Patient left up in chair with call button in reach, chair alarm on, and pt's nurse notified.    GOALS:   Multidisciplinary Problems       Physical Therapy Goals          Problem: Physical Therapy    Goal Priority Disciplines Outcome Goal Variances Interventions   Physical Therapy Goal     PT, PT/OT      Description: Pt will perform bed mobility independently in order to participate in EOB activity.  Pt will perform transfers independently in order to participate in OOB activity.   Pt will ambulate 25 ft x 2 SPV with LRAD in order to participate in daily tasks.                         Time Tracking:     PT Received On: 11/06/22  PT Start Time: 0906     PT Stop Time: 0926  PT Total Time (min): 20 min     Billable Minutes: Therapeutic Activity 20    Treatment Type: Treatment  PT/PTA: PTA     PTA Visit Number: 1     11/06/2022

## 2022-11-06 NOTE — PLAN OF CARE
Remains injury free. Denies c/o pain. No s/s of bleeding. Having bowel movements. Stable for discharge.

## 2022-11-06 NOTE — NURSING
Discharge instructions, written and verbal, given to patient and daughter in law. Teach back complete.

## 2022-11-06 NOTE — SUBJECTIVE & OBJECTIVE
Past Medical History:   Diagnosis Date    Anemia, unspecified     Aortic stenosis     CVA (cerebral vascular accident)     Essential (primary) hypertension     Mixed hyperlipidemia        Past Surgical History:   Procedure Laterality Date    APPENDECTOMY      CATARACT EXTRACTION      CHOLECYSTECTOMY      HYSTERECTOMY         Review of patient's allergies indicates:   Allergen Reactions    Sulfa (sulfonamide antibiotics)      Questionable in record       No current facility-administered medications on file prior to encounter.     Current Outpatient Medications on File Prior to Encounter   Medication Sig    atorvastatin (LIPITOR) 40 MG tablet Take 1 tablet by mouth once daily.    cranberry fruit concentrate 250 mg Chew Take by mouth.    multivit/folic acid/vit K1 (ONE-A-DAY WOMEN'S 50 PLUS ORAL) Take 1 tablet by mouth once daily.    [DISCONTINUED] amLODIPine (NORVASC) 2.5 MG tablet Take 2.5 mg by mouth once daily.    [DISCONTINUED] aspirin (ECOTRIN) 81 MG EC tablet Take 81 mg by mouth.    [DISCONTINUED] olmesartan (BENICAR) 40 MG tablet Take 40 mg by mouth once daily.     Family History       Problem Relation (Age of Onset)    Alzheimer's disease Mother    Heart attack Father          Tobacco Use    Smoking status: Never    Smokeless tobacco: Not on file   Substance and Sexual Activity    Alcohol use: Never    Drug use: Not on file    Sexual activity: Not on file     Review of Systems   Unable to perform ROS: Dementia   Constitutional: Positive for malaise/fatigue.   Hematologic/Lymphatic: Bruises/bleeds easily.   Gastrointestinal:         Frequent stools   Psychiatric/Behavioral:  Positive for memory loss.    Allergic/Immunologic: Negative.    Objective:     Vital Signs (Most Recent):  Temp: 98 °F (36.7 °C) (11/06/22 0854)  Pulse: 82 (11/06/22 0854)  Resp: 18 (11/06/22 0854)  BP: (!) 144/61 (11/06/22 0854)  SpO2: 99 % (11/06/22 0854) Vital Signs (24h Range):  Temp:  [97.7 °F (36.5 °C)-98.5 °F (36.9 °C)] 98 °F (36.7  °C)  Pulse:  [51-82] 82  Resp:  [16-18] 18  SpO2:  [97 %-99 %] 99 %  BP: (133-163)/(61-78) 144/61     Weight: 48.2 kg (106 lb 4.2 oz)  Body mass index is 17.15 kg/m².    SpO2: 99 %  O2 Device (Oxygen Therapy): room air      Intake/Output Summary (Last 24 hours) at 11/6/2022 1708  Last data filed at 11/6/2022 1340  Gross per 24 hour   Intake 487.83 ml   Output --   Net 487.83 ml         Lines/Drains/Airways       None                   Physical Exam  Vitals and nursing note reviewed.   Constitutional:       General: She is not in acute distress.     Appearance: She is well-developed. She is not diaphoretic.      Comments: Elderly, frail appearing   HENT:      Head: Normocephalic and atraumatic.   Eyes:      General:         Right eye: No discharge.         Left eye: No discharge.      Pupils: Pupils are equal, round, and reactive to light.   Neck:      Thyroid: No thyromegaly.      Vascular: No JVD.      Trachea: No tracheal deviation.   Cardiovascular:      Rate and Rhythm: Normal rate and regular rhythm. Extrasystoles are present.     Heart sounds: S1 normal and S2 normal. Murmur heard.   Harsh midsystolic murmur is present at the upper right sternal border radiating to the neck.   Pulmonary:      Effort: Pulmonary effort is normal. No respiratory distress.      Breath sounds: Normal breath sounds. No wheezing or rales.   Abdominal:      General: There is no distension.      Palpations: Abdomen is soft.      Tenderness: There is no rebound.   Musculoskeletal:      Cervical back: Neck supple.      Right lower leg: No edema.      Left lower leg: No edema.   Skin:     General: Skin is warm and dry.      Coloration: Skin is pale.      Findings: No erythema.   Neurological:      General: No focal deficit present.      Mental Status: She is alert and oriented to person, place, and time.   Psychiatric:         Mood and Affect: Mood normal.         Behavior: Behavior normal.         Thought Content: Thought content normal.        Significant Labs: CMP   Recent Labs   Lab 11/05/22  0557 11/06/22  0552    140   K 3.1* 3.1*    105   CO2 24 25   GLU 60* 106   BUN 13 14   CREATININE 0.8 0.9   CALCIUM 8.1* 8.2*   PROT 4.8* 5.3*   ALBUMIN 1.7* 1.9*   BILITOT 0.7 0.6   ALKPHOS 92 102   AST 43* 50*   ALT 30 35   ANIONGAP 8 10     , CBC   Recent Labs   Lab 11/05/22  0557 11/06/22  0552   WBC 7.81 11.08   HGB 8.3* 9.3*   HCT 26.4* 29.2*    224     , INR No results for input(s): INR, PROTIME in the last 48 hours., Troponin   No results for input(s): TROPONINI in the last 48 hours.  , and All pertinent lab results from the last 24 hours have been reviewed.    Significant Imaging: Echocardiogram: Transthoracic echo (TTE) complete (Cupid Only):   Results for orders placed or performed during the hospital encounter of 11/04/22   Echo   Result Value Ref Range    BSA 1.54 m2    TDI SEPTAL 0.06 m/s    LV LATERAL E/E' RATIO 18.13 m/s    LV SEPTAL E/E' RATIO 24.17 m/s    IVC diameter 1.72 cm    Left Ventricular Outflow Tract Mean Velocity 0.87 cm/s    Left Ventricular Outflow Tract Mean Gradient 3.24 mmHg    TDI LATERAL 0.08 m/s    LVIDd 3.88 3.5 - 6.0 cm    IVS 1.37 (A) 0.6 - 1.1 cm    Posterior Wall 1.53 (A) 0.6 - 1.1 cm    Ao root annulus 2.62 cm    LVIDs 2.64 2.1 - 4.0 cm    FS 32 28 - 44 %    STJ 2.65 cm    Ascending aorta 3.76 cm    LV mass 211.31 g    LA size 3.34 cm    RVDD 2.93 cm    RV S' 0.01 cm/s    Left Ventricle Relative Wall Thickness 0.79 cm    AV regurgitation pressure 1/2 time 453.39191442889296 ms    AV mean gradient 47 mmHg    AV valve area 0.79 cm2    AV Velocity Ratio 0.26     AV index (prosthetic) 0.25     E/A ratio 0.75     Mean e' 0.07 m/s    E wave deceleration time 180.27 msec    IVRT 45.67 msec    LVOT diameter 2.01 cm    LVOT area 3.2 cm2    LVOT peak danelle 1.12 m/s    LVOT peak VTI 30.90 cm    Ao peak danelle 4.37 m/s    Ao .5 cm    RVOT peak danelle 0.83 m/s    RVOT peak VTI 21.9 cm    Mr max danelle 5.96 m/s     LVOT stroke volume 98.00 cm3    AV peak gradient 76 mmHg    PV mean gradient 1.48 mmHg    E/E' ratio 20.71 m/s    MV Peak E Peña 1.45 m/s    AR Max Peña 4.20 m/s    TR Max Peña 3.23 m/s    MV Peak A Peña 1.93 m/s    LV Systolic Volume 25.54 mL    LV Systolic Volume Index 16.3 mL/m2    LV Diastolic Volume 65.22 mL    LV Diastolic Volume Index 41.54 mL/m2    LV Mass Index 135 g/m2    RA Major Axis 5.08 cm    Left Atrium Minor Axis 5.12 cm    Left Atrium Major Axis 5.14 cm    Triscuspid Valve Regurgitation Peak Gradient 42 mmHg    LA Volume Index (Mod) 31.6 mL/m2    LA volume (mod) 49.57 cm3    LA WIDTH 4.50 cm    LA volume 65.54 cm3    TAPSE 2.10 cm    LA Volume Index 41.7 mL/m2    RA Width 3.00 cm    Right Atrial Pressure (from IVC) 3 mmHg    EF 60 %    TV rest pulmonary artery pressure 45 mmHg    Narrative    · The left ventricle is normal in size with concentric hypertrophy and   normal systolic function.  · Moderate left atrial enlargement.  · The estimated ejection fraction is 60%.  · Grade I left ventricular diastolic dysfunction.  · Normal right ventricular size with normal right ventricular systolic   function.  · Mild right atrial enlargement.  · Moderate aortic regurgitation.  · There is severe aortic valve stenosis.  · Aortic valve area is 0.79 cm2; peak velocity is 4.37 m/s; mean gradient   is 47 mmHg.  · Mild mitral regurgitation.  · Mild tricuspid regurgitation.  · Normal central venous pressure (3 mmHg).  · The estimated PA systolic pressure is 45 mmHg.  · There is pulmonary hypertension.        , EKG: Reviewed, and X-Ray: CXR: X-Ray Chest 1 View (CXR): No results found for this visit on 11/04/22. and X-Ray Chest PA and Lateral (CXR): No results found for this visit on 11/04/22.

## 2022-11-06 NOTE — PLAN OF CARE
Patient remained free from injury. Bed alarm set. Family at bedside. More cooperative during shift. Several loose stools during the night. No s/s of acute distress. 12hr chart check complete.

## 2022-11-07 LAB — BACTERIA UR CULT: ABNORMAL

## 2022-11-07 NOTE — DISCHARGE SUMMARY
[facility] Medicine  Discharge Summary      Patient Name: Syeda Dunlap  MRN: [id number]  KAITY: [id number]  Patient Class: IP- Inpatient  Admission Date: 11/4/2022  Hospital Length of Stay: 2 days  Discharge Date and Time: 11/6/2022  2:33 PM  Attending Physician: No att. providers found   Discharging Provider: Ray Hyman MD  Primary Care Provider: Montse Arciniega NP    Primary Care Team: Networked reference to record PCT     HPI:   The patient is a 89 y.o. female with Dementia, CVA- no deficits, Aortic stenosis, HTN, and HLD who presented to [facility] ED with generalized weakness, fatigue, pale skin, and frequent stools. Family unsure if stools are black or bloody.   She was found to have new onset Atrial fibrillation -rate 90. Hemoglobin below baseline at 7.6. Stool positive for occult blood, BP stable. Started on IV protonix and will get 1 unit PRBC transfusion. Potassium has been replaced. Transferred to [facility] for GI evaluation.     Of note, the family reports the pt was found to have anemia 6/2022. She was transfused and followed up with GI who recommended to hold ASA and treat conservatively. Pt was also recently noted to have low blood pressure. PCP recommended to hold home meds Amlodipine and Losartan.     The patient has dementia. Her son, Kervin Dunlap, has POA.     The patient will be placed in observation under hospital medicine           * No surgery found *      Hospital Course:   Admitted for further evaluation of increased generalized weakness and anemia with Hgb 7.6 with baseline around 10. No clear h/o melena or bright red blood per rectum this time but stool occult blood is positive. S/P 1 units of P-RBC overnight with repeat Hgb 9.3 this morning. CT abd/pelvis suggestive of moderate fecal material in the rectum  and proctocolitis. GI consulted and suggested lower endoscopy this admit once cleared by Cardiology. Pt is noted to have new onset Atrial  fibrillation with controlled rate. Echo done today confirmed severe aortic stenosis with valve area 0.79.     11/5- No overt bleeding reported. Stool reportedly loose and brown. Hgb is fairly stable. Iron study is suggestive of anemia of chronic disease with iron deficiency. IV iron initiated. GI aborted plan for endoscopy given pt deemed high risk per Cardiology  with h/o severe aortic stenosis. Bowel regimen initiated for fecal impaction and surrounding inflammation in the rectum.  K.3.1. Replacement in progress. Urine culture growing GNR. Rocephin day #2.     11/6- Multiple bowel movements reported overnight with Fleets enema and Lactulose yesterday. No overt GI bleeding reported and Hgb remains stable at 9.3 post transfusion x 1 unit. Pt remains in A.fib with controlled rate. Risk of stroke discussed with the family. Pt is at risk of overt GI bleed with AC treatment given risk of gastrointestinal AVM in the setting of severe AS. Family opted conservative treatment without AC and accepted the risk of stoke. At this point , pt deemed stable for discharge to home with home health and follow up with PCP and primary Cardiologist and Gastroenterologist.        Goals of Care Treatment Preferences:  Code Status: Full Code      Consults:   Consults (From admission, onward)        Status Ordering Provider     Inpatient consult to Social Work  Once        Provider:  (Not yet assigned)    Completed ALFREDO JACKSON     Inpatient consult to Registered Dietitian/Nutritionist  Once        Provider:  (Not yet assigned)    Completed TONI ABBOTT     Inpatient consult to Gastroenterology  Once        Provider:  (Not yet assigned)    Completed TONI ABBOTT     Inpatient consult to Cardiology  Once        Provider:  (Not yet assigned)    Completed TONI ABBOTT     IP consult to case management  Once        Provider:  (Not yet assigned)    Completed MARYLU PRYOR          No new Assessment & Plan notes have been filed  under this hospital service since the last note was generated.  Service: Hospital Medicine    Final Active Diagnoses:    Diagnosis Date Noted POA    PRINCIPAL PROBLEM:  GI bleed [K92.2] 11/04/2022 Yes    Iron deficiency anemia due to chronic blood loss [D50.0] 06/27/2022 Yes    New onset a-fib [I48.91] 11/04/2022 Yes    Aortic stenosis [I35.0] 11/04/2022 Yes    Elevated troponin [R77.8] 11/04/2022 Yes    History of stroke [Z86.73] 11/04/2022 Not Applicable    UTI (urinary tract infection) [N39.0] 11/04/2022 Yes    Stercoral colitis of Rectum  [K52.89] 11/05/2022 Yes      Problems Resolved During this Admission:       Discharged Condition: stable    Disposition: Home or Self Care    Follow Up:   Follow-up Information     Montse Arciniega NP. Schedule an appointment as soon as possible for a visit in 3 day(s).    Specialty: Family Medicine  Why: Discharge follow up  Contact information:  [address] [place] [id number]  [phone]                       Patient Instructions:      Ambulatory referral/consult to Home Health   Standing Status: Future   Referral Priority: Routine Referral Type: Home Health   Referral Reason: Specialty Services Required   Requested Specialty: Home Health Services   Number of Visits Requested: 1     Diet Cardiac     Activity as tolerated       Significant Diagnostic Studies: Labs:   BMP:   Recent Labs   Lab 11/05/22  0557 11/06/22  0552   GLU 60* 106    140   K 3.1* 3.1*    105   CO2 24 25   BUN 13 14   CREATININE 0.8 0.9   CALCIUM 8.1* 8.2*   MG 1.7 1.7   , CMP   Recent Labs   Lab 11/05/22  0557 11/06/22  0552    140   K 3.1* 3.1*    105   CO2 24 25   GLU 60* 106   BUN 13 14   CREATININE 0.8 0.9   CALCIUM 8.1* 8.2*   PROT 4.8* 5.3*   ALBUMIN 1.7* 1.9*   BILITOT 0.7 0.6   ALKPHOS 92 102   AST 43* 50*   ALT 30 35   ANIONGAP 8 10    and CBC   Recent Labs   Lab 11/05/22  0557 11/06/22  0552   WBC 7.81 11.08   HGB 8.3* 9.3*   HCT 26.4* 29.2*     224       Pending Diagnostic Studies:     None         Medications:  Reconciled Home Medications:      Medication List      START taking these medications    cefUROXime 250 MG tablet  Commonly known as: CEFTIN  Take 2 tablets (500 mg total) by mouth every 12 (twelve) hours. for 3 days     docusate sodium 100 MG capsule  Commonly known as: COLACE  Take 1 capsule (100 mg total) by mouth 2 (two) times daily.     furosemide 20 MG tablet  Commonly known as: LASIX  Take 1 tablet (20 mg total) by mouth once daily.     metoprolol succinate 25 MG 24 hr tablet  Commonly known as: TOPROL-XL  Take 0.5 tablets (12.5 mg total) by mouth once daily.  Start taking on: November 7, 2022     metroNIDAZOLE 500 MG tablet  Commonly known as: FLAGYL  Take 1 tablet (500 mg total) by mouth every 8 (eight) hours. for 3 days     pantoprazole 40 MG tablet  Commonly known as: PROTONIX  Take 1 tablet (40 mg total) by mouth once daily.     polyethylene glycol 17 gram Pwpk  Commonly known as: GLYCOLAX  Take 17 g by mouth once daily.  Start taking on: November 7, 2022     potassium chloride SA 20 MEQ tablet  Commonly known as: K-DUR,KLOR-CON  Take 1 tablet (20 mEq total) by mouth once daily.        CONTINUE taking these medications    atorvastatin 40 MG tablet  Commonly known as: LIPITOR  Take 1 tablet by mouth once daily.     cranberry fruit concentrate 250 mg Chew  Take by mouth.     ONE-A-DAY WOMEN'S 50 PLUS ORAL  Take 1 tablet by mouth once daily.        STOP taking these medications    amLODIPine 2.5 MG tablet  Commonly known as: NORVASC     olmesartan 40 MG tablet  Commonly known as: BENICAR            Indwelling Lines/Drains at time of discharge:   Lines/Drains/Airways     None                 Time spent on the discharge of patient: 30  minutes         Ray Hyman MD  Department of Hospital Medicine  [facility] - Med Surg

## 2022-11-08 ENCOUNTER — PATIENT OUTREACH (OUTPATIENT)
Dept: ADMINISTRATIVE | Facility: CLINIC | Age: 87
End: 2022-11-08
Payer: MEDICARE

## 2022-11-10 ENCOUNTER — LAB REQUISITION (OUTPATIENT)
Dept: LAB | Facility: HOSPITAL | Age: 87
End: 2022-11-10
Payer: MEDICARE

## 2022-11-10 DIAGNOSIS — D63.8 ANEMIA IN OTHER CHRONIC DISEASES CLASSIFIED ELSEWHERE: ICD-10-CM

## 2022-11-10 DIAGNOSIS — I10 ESSENTIAL (PRIMARY) HYPERTENSION: ICD-10-CM

## 2022-11-10 DIAGNOSIS — E78.01 FAMILIAL HYPERCHOLESTEROLEMIA: ICD-10-CM

## 2022-11-10 LAB
ALBUMIN SERPL-MCNC: 1.8 GM/DL (ref 3.4–4.8)
ALBUMIN/GLOB SERPL: 0.4 RATIO (ref 1.1–2)
ALP SERPL-CCNC: 135 UNIT/L (ref 40–150)
ALT SERPL-CCNC: 53 UNIT/L (ref 0–55)
AST SERPL-CCNC: 119 UNIT/L (ref 5–34)
BASOPHILS # BLD AUTO: 0.03 X10(3)/MCL (ref 0–0.2)
BASOPHILS NFR BLD AUTO: 0.3 %
BILIRUBIN DIRECT+TOT PNL SERPL-MCNC: 0.7 MG/DL
BUN SERPL-MCNC: 17.8 MG/DL (ref 9.8–20.1)
CALCIUM SERPL-MCNC: 8.6 MG/DL (ref 8.4–10.2)
CHLORIDE SERPL-SCNC: 107 MMOL/L (ref 98–111)
CHOLEST SERPL-MCNC: 126 MG/DL
CHOLEST/HDLC SERPL: 2 {RATIO} (ref 0–5)
CO2 SERPL-SCNC: 25 MMOL/L (ref 23–31)
CREAT SERPL-MCNC: 1.11 MG/DL (ref 0.55–1.02)
DEPRECATED CALCIDIOL+CALCIFEROL SERPL-MC: 41.5 NG/ML (ref 30–80)
EOSINOPHIL # BLD AUTO: 0.05 X10(3)/MCL (ref 0–0.9)
EOSINOPHIL NFR BLD AUTO: 0.5 %
ERYTHROCYTE [DISTWIDTH] IN BLOOD BY AUTOMATED COUNT: 17 % (ref 11.5–17)
EST. AVERAGE GLUCOSE BLD GHB EST-MCNC: 99.7 MG/DL
GFR SERPLBLD CREATININE-BSD FMLA CKD-EPI: 46 MLS/MIN/1.73/M2
GLOBULIN SER-MCNC: 4.5 GM/DL (ref 2.4–3.5)
GLUCOSE SERPL-MCNC: 77 MG/DL (ref 75–121)
HBA1C MFR BLD: 5.1 %
HCT VFR BLD AUTO: 33.9 % (ref 37–47)
HDLC SERPL-MCNC: 55 MG/DL (ref 35–60)
HGB BLD-MCNC: 10.5 GM/DL (ref 12–16)
LDLC SERPL CALC-MCNC: 61 MG/DL (ref 50–140)
LYMPHOCYTES # BLD AUTO: 2.41 X10(3)/MCL (ref 0.6–4.6)
LYMPHOCYTES NFR BLD AUTO: 25.7 %
MCH RBC QN AUTO: 28.3 PG (ref 27–31)
MCHC RBC AUTO-ENTMCNC: 31 MG/DL (ref 33–36)
MCV RBC AUTO: 91.4 FL (ref 80–94)
MONOCYTES # BLD AUTO: 0.62 X10(3)/MCL (ref 0.1–1.3)
MONOCYTES NFR BLD AUTO: 6.6 %
NEUTROPHILS # BLD AUTO: 6.3 X10(3)/MCL (ref 2.1–9.2)
NEUTROPHILS NFR BLD AUTO: 66.7 %
PLATELET # BLD AUTO: 280 X10(3)/MCL (ref 130–400)
PMV BLD AUTO: 10 FL (ref 7.4–10.4)
POTASSIUM SERPL-SCNC: 3.7 MMOL/L (ref 3.5–5.1)
PROT SERPL-MCNC: 6.3 GM/DL (ref 5.8–7.6)
RBC # BLD AUTO: 3.71 X10(6)/MCL (ref 4.2–5.4)
SODIUM SERPL-SCNC: 144 MMOL/L (ref 132–146)
TRIGL SERPL-MCNC: 51 MG/DL (ref 37–140)
TSH SERPL-ACNC: 5.13 UIU/ML (ref 0.35–4.94)
VLDLC SERPL CALC-MCNC: 10 MG/DL
WBC # SPEC AUTO: 9.4 X10(3)/MCL (ref 4.5–11.5)

## 2022-11-10 PROCEDURE — 80053 COMPREHEN METABOLIC PANEL: CPT | Performed by: NURSE PRACTITIONER

## 2022-11-10 PROCEDURE — 84443 ASSAY THYROID STIM HORMONE: CPT | Performed by: NURSE PRACTITIONER

## 2022-11-10 PROCEDURE — 85025 COMPLETE CBC W/AUTO DIFF WBC: CPT | Performed by: NURSE PRACTITIONER

## 2022-11-10 PROCEDURE — 82306 VITAMIN D 25 HYDROXY: CPT | Performed by: NURSE PRACTITIONER

## 2022-11-10 PROCEDURE — 80061 LIPID PANEL: CPT | Performed by: NURSE PRACTITIONER

## 2022-11-10 PROCEDURE — 83036 HEMOGLOBIN GLYCOSYLATED A1C: CPT | Performed by: NURSE PRACTITIONER

## 2022-11-10 NOTE — PHYSICIAN QUERY
PT Name: Syeda Dunlap  MR #: [id number]     DOCUMENTATION CLARIFICATION      CDS/: Dorothy Jeong RN, CDS               Contact information: [email or web address]    This form is a permanent document in the medical record.     Query Date: November 10, 2022    By submitting this query, we are merely seeking further clarification of documentation.  Please utilize your independent clinical judgment when addressing the question(s) below.     The Medical Record contains the following:    Clinical Information Location in Medical Record   89 y.o. female with Dementia, CVA- no deficits, Aortic stenosis, HTN, and HLD who presented to [facility] ED with generalized weakness, fatigue, pale skin, and frequent stools. Family unsure if stools are black or bloody.   She was found to have new onset Atrial fibrillation -rate 90. Hemoglobin below baseline at 7.6. Stool positive for occult blood,    Her baseline seems to be around 10, but she was admitted in June 2022 for Hgb around 7 with rectal bleeding. Family opted monitoring over endoscopic evaluation at that time after discussing risks/benefits with a GI provider    CT abd/pel w/ contrast showed moderate fecal material in the rectum with mild wall thickening and surrounding inflammation suggestive of proctocolitis    Questionable rectal bleeding with CT showing possible proctocolitis and moderate fecal material    GI aborted plan for endoscopy given high risk candidate with h/o severe aortic stenosis.    Pt could have GI AVM given h/o severe aortic stenosis, however endoscopic intervention not offered given high risk     Iron deficiency anemia due to chronic blood loss  Patient with acute on chronic anemia. Good response to 1u PRBC.  Likely multifactorial   CT showing possible proctocolitis and moderate fecal material, likely from rectal stool burden/sterocal ulcer.    - no plans for endoscopy given pt is high risk and hemoglobin has remained  relatively stable  - agree with good bowel regimen to include enemas if needed  - can do short course of hydrocortisone suppositories for findings on CT scan    start Eliquis once rectal bleeding/hemorroids resolve    Bowel regimen initiated for fecal impaction and surrounding inflammation in the rectum.    PRINCIPAL PROBLEM:  GI bleed      H&P 11/4         GI Consult 11/4       GI Consult 11/4         GI Consult 11/4       GI Consult 11/4       Hosp Med PN 11/5      Hosp Med PN 11/5      GI PN 11/5                     Cardiology PN 11/6     DC Summary 11/6      DC Summary 11/6     Please document your best medical opinion regarding the etiology of __ ___GI Bleed__?       [  x ] Stercoral Ulcer    [   ] Proctocolitis   [   ] AVM   [   ] Hemorrhoids   [   ] Other etiology (please specify):___________________   [  ] Clinically Undetermined             Please document in your progress notes daily for the duration of treatment, until resolved, and include in your discharge summary.

## 2022-11-10 NOTE — PHYSICIAN QUERY
PT Name: Syeda Dunlap  MR #: [id number]    DOCUMENTATION CLARIFICATION     CDS/: Dorothy Jeong               Contact information:     This form is a permanent document in the medical record.     Query Date: November 10, 2022    By submitting this query, we are merely seeking further clarification of documentation.. Please utilize your independent clinical judgment when addressing the question(s) below.    The medical record contains the following:   Indicators  Supporting Clinical Findings Location in Medical Record   x Energy Intake The pt son reports that his mother typically consumes an ensure for breakfast, her daily lunches are provided by meals on wheels, and his brother who stays with his mom prepares dinners. RD encouraged the pt son to ensure that his mother is consuming 4-5 small frequent meals throughout the day. Pt is currently on clear liquid diet. The pt son reports that his mother appetite was poor yesterday d/t not feeling well however, believes that her appetite is returning since being admitted into the hospital   Nutrition Consult 11/4    x Weight Loss 8% x 1 month    Nutrition Consult 11/4    x Fat Loss Moderate fat depletion   Nutrition Consult 11/4    x Muscle Loss Moderate muscle wasting   Nutrition Consult 11/4     Edema/Fluid Accumulation      Reduced  Strength (by dynamometer)     x Weight, BMI, Usual Body Weight BMI (Calculated): 18.3 Nutrition Consult 11/4     Delayed Wound Healing     x Registered Dietician Diagnosis Moderate Protein-Calorie Malnutrition  Malnutrition in the context of Acute Illness/Injury   Nutrition Consult 11/4    x Acute or Chronic Illness 89 y.o. female with Dementia, CVA- no deficits, Aortic stenosis, HTN, and HLD who presented to [facility] ED with generalized weakness, fatigue, pale skin, and frequent stools H&P 11/4     Social or Environmental Circumstances     x Treatment re-consult for nutrition support if unable to advance PO diet  to full liquids in < 5 days  4. Recommend the pt receives Tayo BID on tray with meal when PO diet is advanced Nutrition Consult 11/4     Other       Academy of Nutrition and Dietetics (Academy) and the American Society for Parenteral and Enteral Nutrition (A.S.P.E.N.) Clinical Characteristics to support Malnutrition   Malnutrition in the Context of Acute Illness or Injury Malnutrition in the Context of Chronic Illness or Injury Malnutrition in the Context of Social or Environmental Circumstances   Malnutrition Level Moderate Severe Moderate Severe   Moderate   Severe   Energy Intake <75%                   >7 days <50%                 >5 days <75%           >1 month <75%                      >1 month   <75% for >3 months   <50% for >1 month   Weight Loss   1-2% in 1 week >2% in 1 week 5% in 1 month >5% in 1 month 5% in 1 month >5% in 1 month    5% in 1 month >5% in 1 month 7.5% in 3 months >7.5% in 3 months 7.5% in 3 months >7.5% in 3 months    7.5% in 3 months >7.5% in 3 months 10% in 6 months >10% in 6 months 10% in 6 months >10% in 6 months        20% in 1 year                    >20% in 1 year                                                                  20% in 1 year                            >20% in 1 year                                                  Subcutaneous Fat Loss Mild  Moderate  Mild  Severe    Mild   Severe   Muscle Loss Mild depletion Moderate depletion  Mild depletion Severe Depletion   Mild   Severe   Edema/Fluid Accumulation Mild Moderate to severe  Mild  Severe   Mild   Severe   Reduced  Strength         (based on standards supplied by  of dynamometer) N/A Measurably reduced N/A Measurably reduced N/A Measurably reduced     Criteria for mild malnutrition is defined as 1 characteristic outlined above within the established moderate or severe parameters.  A minimum of 2 out of the 6 characteristics noted above are recommended for a diagnosis of moderate or severe  malnutrition.  Chronic illness/injury is a disease/condition lasting 3 months or longer.    The noted clinical guidelines are only system guidelines and do not replace the providers clinical judgment.    Provider, please specify diagnosis or diagnoses associated with above clinical findings.    [ x ] Moderate Malnutrition - a minimum of 2 of the 6 moderate malnutrition characteristics noted above    [  ] Malnutrition, Unspecified degree   [  ] Other Nutritional Diagnosis or clarification (please specify): _______   [  ] Clinically Undetermined       Please document in your progress notes daily for the duration of treatment until resolved and  include in your discharge summary.      References:    ISSAC Hameed, & SIM Stephens (2022, April). Assessment and management of anorexia and cachexia in palliative care. Retrieved May 23, 2022, from https://www.[employer]/contents/assessment-and-management-of-anorexia-and-cachexia-in-palliative-care?qtwtpTnh=8638&source=see_link     HUBERT Ha, PhD, RD, Obed RUFF P., PhD, RN, JAZ Campbell MD, PhD, Pam MALDONADO A., MS, RD, [facility], DONAVAN Vega, MS, RD, The Academy Malnutrition Work Group, The A.S.P.E.N. Board of Directors. (2012). Consensus Statement: Academy of Nutrition and Dietetics and American Society for Parenteral and Enteral Nutrition: Characteristics Recommended for the Identification and Documentation of Adult Malnutrition (Undernutrition). Journal of Parenteral and Enteral Nutrition, 36(3), 275-283. doi:10.1177/2657194394362096     Form No. 61311

## 2023-01-25 NOTE — CONSULTS
High risk wound care screening performed by HUBERT Ayala RN due to documented wound to sacral region. On assessment, present on admission stage 3 pressure injury noted to sacral/coccygeal region that measures 1x1x0.1cm, full thickness tissue loss with red and yellow subcutaneous tissue to wound bed. Foam dressing applied. Recommend turn q2 hours, change foam dressing twice weekly and prn. Apply JUSTINO pump to bed. Will follow.    Lab Facility: 0

## 2023-02-06 PROBLEM — N39.0 UTI (URINARY TRACT INFECTION): Status: RESOLVED | Noted: 2022-11-04 | Resolved: 2023-02-06

## 2023-02-06 PROBLEM — K92.2 GI BLEED: Status: RESOLVED | Noted: 2022-11-04 | Resolved: 2023-02-06
